# Patient Record
Sex: MALE | Race: ASIAN | NOT HISPANIC OR LATINO
[De-identification: names, ages, dates, MRNs, and addresses within clinical notes are randomized per-mention and may not be internally consistent; named-entity substitution may affect disease eponyms.]

---

## 2017-12-03 ENCOUNTER — TRANSCRIPTION ENCOUNTER (OUTPATIENT)
Age: 22
End: 2017-12-03

## 2018-04-22 ENCOUNTER — HOSPITAL ENCOUNTER (EMERGENCY)
Dept: HOSPITAL 74 - FER | Age: 23
Discharge: HOME | End: 2018-04-22
Payer: SELF-PAY

## 2018-04-22 VITALS — SYSTOLIC BLOOD PRESSURE: 140 MMHG | HEART RATE: 90 BPM | TEMPERATURE: 99.1 F | DIASTOLIC BLOOD PRESSURE: 82 MMHG

## 2018-04-22 VITALS — BODY MASS INDEX: 26.6 KG/M2
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
## 2018-04-22 DIAGNOSIS — S63.681A: Primary | ICD-10-CM
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
## 2018-09-02 ENCOUNTER — HOSPITAL ENCOUNTER (EMERGENCY)
Dept: HOSPITAL 74 - FER | Age: 23
Discharge: HOME | End: 2018-09-02
Payer: COMMERCIAL

## 2018-09-02 VITALS — TEMPERATURE: 98.8 F | HEART RATE: 79 BPM | SYSTOLIC BLOOD PRESSURE: 119 MMHG | DIASTOLIC BLOOD PRESSURE: 68 MMHG

## 2018-09-02 VITALS — BODY MASS INDEX: 22 KG/M2

## 2018-09-02 DIAGNOSIS — S90.851A: Primary | ICD-10-CM
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
## 2018-09-02 PROCEDURE — 0JCQ0ZZ EXTIRPATION OF MATTER FROM RIGHT FOOT SUBCUTANEOUS TISSUE AND FASCIA, OPEN APPROACH: ICD-10-PCS

## 2019-03-17 ENCOUNTER — TRANSCRIPTION ENCOUNTER (OUTPATIENT)
Age: 24
End: 2019-03-17

## 2019-05-06 ENCOUNTER — HOSPITAL ENCOUNTER (EMERGENCY)
Dept: HOSPITAL 74 - FER | Age: 24
Discharge: HOME | End: 2019-05-06
Payer: COMMERCIAL

## 2019-05-06 VITALS — BODY MASS INDEX: 24.3 KG/M2

## 2019-05-06 VITALS — DIASTOLIC BLOOD PRESSURE: 100 MMHG | SYSTOLIC BLOOD PRESSURE: 148 MMHG | HEART RATE: 86 BPM | TEMPERATURE: 98.3 F

## 2019-05-06 DIAGNOSIS — S83.412A: Primary | ICD-10-CM
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
## 2021-03-05 PROBLEM — Z00.00 ENCOUNTER FOR PREVENTIVE HEALTH EXAMINATION: Status: ACTIVE | Noted: 2021-03-05

## 2021-03-15 ENCOUNTER — NON-APPOINTMENT (OUTPATIENT)
Age: 26
End: 2021-03-15

## 2021-03-15 ENCOUNTER — APPOINTMENT (OUTPATIENT)
Dept: INTERNAL MEDICINE | Facility: CLINIC | Age: 26
End: 2021-03-15
Payer: COMMERCIAL

## 2021-03-15 ENCOUNTER — LABORATORY RESULT (OUTPATIENT)
Age: 26
End: 2021-03-15

## 2021-03-15 VITALS
OXYGEN SATURATION: 99 % | SYSTOLIC BLOOD PRESSURE: 138 MMHG | WEIGHT: 158 LBS | BODY MASS INDEX: 25.39 KG/M2 | DIASTOLIC BLOOD PRESSURE: 92 MMHG | TEMPERATURE: 97.2 F | HEIGHT: 66 IN | HEART RATE: 90 BPM

## 2021-03-15 DIAGNOSIS — Z11.4 ENCOUNTER FOR SCREENING FOR HUMAN IMMUNODEFICIENCY VIRUS [HIV]: ICD-10-CM

## 2021-03-15 DIAGNOSIS — Z11.59 ENCOUNTER FOR SCREENING FOR OTHER VIRAL DISEASES: ICD-10-CM

## 2021-03-15 DIAGNOSIS — Z13.21 ENCOUNTER FOR SCREENING FOR NUTRITIONAL DISORDER: ICD-10-CM

## 2021-03-15 DIAGNOSIS — Z13.1 ENCOUNTER FOR SCREENING FOR DIABETES MELLITUS: ICD-10-CM

## 2021-03-15 DIAGNOSIS — Z13.220 ENCOUNTER FOR SCREENING FOR LIPOID DISORDERS: ICD-10-CM

## 2021-03-15 PROCEDURE — 36415 COLL VENOUS BLD VENIPUNCTURE: CPT

## 2021-03-15 PROCEDURE — 99385 PREV VISIT NEW AGE 18-39: CPT | Mod: 25

## 2021-03-15 PROCEDURE — 99214 OFFICE O/P EST MOD 30 MIN: CPT | Mod: 25

## 2021-03-15 PROCEDURE — 93000 ELECTROCARDIOGRAM COMPLETE: CPT | Mod: 59

## 2021-03-15 PROCEDURE — 99072 ADDL SUPL MATRL&STAF TM PHE: CPT

## 2021-03-15 PROCEDURE — G0444 DEPRESSION SCREEN ANNUAL: CPT

## 2021-03-15 PROCEDURE — G0442 ANNUAL ALCOHOL SCREEN 15 MIN: CPT

## 2021-03-15 NOTE — PHYSICAL EXAM
[No Acute Distress] : no acute distress [Well Nourished] : well nourished [Well Developed] : well developed [Well-Appearing] : well-appearing [Normal Sclera/Conjunctiva] : normal sclera/conjunctiva [EOMI] : extraocular movements intact [Normal Outer Ear/Nose] : the outer ears and nose were normal in appearance [No JVD] : no jugular venous distention [No Lymphadenopathy] : no lymphadenopathy [Supple] : supple [Thyroid Normal, No Nodules] : the thyroid was normal and there were no nodules present [No Respiratory Distress] : no respiratory distress  [No Accessory Muscle Use] : no accessory muscle use [Clear to Auscultation] : lungs were clear to auscultation bilaterally [Normal Percussion] : the chest was normal to percussion [Normal Rate] : normal rate  [Regular Rhythm] : with a regular rhythm [Normal S1, S2] : normal S1 and S2 [No Varicosities] : no varicosities [Pedal Pulses Present] : the pedal pulses are present [No Edema] : there was no peripheral edema [No Palpable Aorta] : no palpable aorta [No Extremity Clubbing/Cyanosis] : no extremity clubbing/cyanosis [Soft] : abdomen soft [Non Tender] : non-tender [Non-distended] : non-distended [No Masses] : no abdominal mass palpated [No HSM] : no HSM [Normal Bowel Sounds] : normal bowel sounds [No Hernias] : no hernias [Normal Supraclavicular Nodes] : no supraclavicular lymphadenopathy [Normal Posterior Cervical Nodes] : no posterior cervical lymphadenopathy [Normal Anterior Cervical Nodes] : no anterior cervical lymphadenopathy [Normal Inguinal Nodes] : no inguinal lymphadenopathy [Normal Femoral Nodes] : no femoral lymphadenopathy [No CVA Tenderness] : no CVA  tenderness [No Spinal Tenderness] : no spinal tenderness [No Joint Swelling] : no joint swelling [Grossly Normal Strength/Tone] : grossly normal strength/tone [No Rash] : no rash [Coordination Grossly Intact] : coordination grossly intact [No Focal Deficits] : no focal deficits [Normal Gait] : normal gait [Deep Tendon Reflexes (DTR)] : deep tendon reflexes were 2+ and symmetric [Speech Grossly Normal] : speech grossly normal [Memory Grossly Normal] : memory grossly normal [Normal Affect] : the affect was normal [Alert and Oriented x3] : oriented to person, place, and time [Normal Mood] : the mood was normal [Normal Insight/Judgement] : insight and judgment were intact [de-identified] : 1-2/6 systolic ejection murmur at base; 1-2/6 systolic murmur at apex. [de-identified] : Normal testes and genitalia.  No hernias.

## 2021-03-15 NOTE — HISTORY OF PRESENT ILLNESS
[FreeTextEntry1] : 25-year-old male here to establish care, for annual comprehensive evaluation and with several medical issues. [de-identified] : The patient feels he is in good physical health, but believes he may have ADHD because he has had trouble paying attention to things.  He also reports, however, that he is suffering from anxiety and depression.  Both of these conditions seem to have begun after the onset of the pandemic, with the patient had to move back in with his parents.  He feels his mother is very demanding and controlling and his father is not very understanding of the situation.  He is able to function at work and elsewhere, but feels that these conditions make it more difficult to do so.  He has had no suicidal ideation. \par The patient has chest pain infrequently, which is always associated with periods of anxiety.  He has never had any exertional chest pain or any other cardiovascular symptoms.

## 2021-03-15 NOTE — HEALTH RISK ASSESSMENT
[0-5] : 0-5 [No] : In the past 12 months have you used drugs other than those required for medical reasons? No [No falls in past year] : Patient reported no falls in the past year [3] : 1) Little interest or pleasure doing things for nearly every day (3) [2] : 2) Feeling down, depressed, or hopeless for more than half of the days (2) [HIV Test offered] : HIV Test offered [Hepatitis C test offered] : Hepatitis C test offered [Fully functional (bathing, dressing, toileting, transferring, walking, feeding)] : Fully functional (bathing, dressing, toileting, transferring, walking, feeding) [Fully functional (using the telephone, shopping, preparing meals, housekeeping, doing laundry, using] : Fully functional and needs no help or supervision to perform IADLs (using the telephone, shopping, preparing meals, housekeeping, doing laundry, using transportation, managing medications and managing finances) [] : No [Audit-CScore] : 0 [[id number]] : 5 [QPD2Lyrzx] : 20

## 2021-03-15 NOTE — ASSESSMENT
[FreeTextEntry1] : Plan as above.\par \par The patient was advised to call for any problems or questions.\par \par Return visit for follow-up in 1 month.

## 2021-03-15 NOTE — REVIEW OF SYSTEMS
[Fatigue] : fatigue [Chest Pain] : chest pain [Insomnia] : insomnia [Anxiety] : anxiety [Depression] : depression [Negative] : Heme/Lymph [Suicidal] : not suicidal

## 2021-03-16 ENCOUNTER — TRANSCRIPTION ENCOUNTER (OUTPATIENT)
Age: 26
End: 2021-03-16

## 2021-03-16 DIAGNOSIS — R74.8 ABNORMAL LEVELS OF OTHER SERUM ENZYMES: ICD-10-CM

## 2021-03-16 LAB
25(OH)D3 SERPL-MCNC: 13.5 NG/ML
ALBUMIN SERPL ELPH-MCNC: 4.6 G/DL
ALP BLD-CCNC: 106 U/L
ALT SERPL-CCNC: 80 U/L
ANION GAP SERPL CALC-SCNC: 11 MMOL/L
AST SERPL-CCNC: 36 U/L
BASOPHILS # BLD AUTO: 0.02 K/UL
BASOPHILS NFR BLD AUTO: 0.2 %
BILIRUB SERPL-MCNC: 0.3 MG/DL
BUN SERPL-MCNC: 18 MG/DL
CALCIUM SERPL-MCNC: 9.5 MG/DL
CHLORIDE SERPL-SCNC: 104 MMOL/L
CHOLEST SERPL-MCNC: 203 MG/DL
CO2 SERPL-SCNC: 26 MMOL/L
CREAT SERPL-MCNC: 0.88 MG/DL
EOSINOPHIL # BLD AUTO: 0.13 K/UL
EOSINOPHIL NFR BLD AUTO: 1.5 %
ESTIMATED AVERAGE GLUCOSE: 114 MG/DL
GLUCOSE SERPL-MCNC: 97 MG/DL
HBA1C MFR BLD HPLC: 5.6 %
HCT VFR BLD CALC: 47.3 %
HCV AB SER QL: NONREACTIVE
HCV S/CO RATIO: 0.13 S/CO
HDLC SERPL-MCNC: 26 MG/DL
HGB BLD-MCNC: 15 G/DL
HIV1+2 AB SPEC QL IA.RAPID: NONREACTIVE
IMM GRANULOCYTES NFR BLD AUTO: 0.4 %
LDLC SERPL CALC-MCNC: NORMAL MG/DL
LYMPHOCYTES # BLD AUTO: 3.54 K/UL
LYMPHOCYTES NFR BLD AUTO: 39.8 %
MAN DIFF?: NORMAL
MCHC RBC-ENTMCNC: 28.8 PG
MCHC RBC-ENTMCNC: 31.7 GM/DL
MCV RBC AUTO: 91 FL
MONOCYTES # BLD AUTO: 0.63 K/UL
MONOCYTES NFR BLD AUTO: 7.1 %
NEUTROPHILS # BLD AUTO: 4.54 K/UL
NEUTROPHILS NFR BLD AUTO: 51 %
NONHDLC SERPL-MCNC: 177 MG/DL
PLATELET # BLD AUTO: 310 K/UL
POTASSIUM SERPL-SCNC: 3.9 MMOL/L
PROT SERPL-MCNC: 7.9 G/DL
RBC # BLD: 5.2 M/UL
RBC # FLD: 13 %
SODIUM SERPL-SCNC: 142 MMOL/L
TRIGL SERPL-MCNC: 401 MG/DL
TSH SERPL-ACNC: 3.93 UIU/ML
WBC # FLD AUTO: 8.9 K/UL

## 2021-04-15 ENCOUNTER — APPOINTMENT (OUTPATIENT)
Dept: INTERNAL MEDICINE | Facility: CLINIC | Age: 26
End: 2021-04-15
Payer: COMMERCIAL

## 2021-04-15 VITALS
HEART RATE: 87 BPM | DIASTOLIC BLOOD PRESSURE: 70 MMHG | OXYGEN SATURATION: 97 % | WEIGHT: 160 LBS | TEMPERATURE: 97.2 F | SYSTOLIC BLOOD PRESSURE: 132 MMHG

## 2021-04-15 DIAGNOSIS — Z87.898 PERSONAL HISTORY OF OTHER SPECIFIED CONDITIONS: ICD-10-CM

## 2021-04-15 PROCEDURE — G0444 DEPRESSION SCREEN ANNUAL: CPT

## 2021-04-15 PROCEDURE — 99072 ADDL SUPL MATRL&STAF TM PHE: CPT

## 2021-04-15 PROCEDURE — 99214 OFFICE O/P EST MOD 30 MIN: CPT | Mod: 25

## 2021-04-15 NOTE — PHYSICAL EXAM
[Normal Sclera/Conjunctiva] : normal sclera/conjunctiva [Normal Outer Ear/Nose] : the outer ears and nose were normal in appearance [Normal Percussion] : the chest was normal to percussion [Normal Rate] : normal rate  [Regular Rhythm] : with a regular rhythm [Normal S1, S2] : normal S1 and S2 [No Edema] : there was no peripheral edema [No Rash] : no rash [Normal Gait] : normal gait [Speech Grossly Normal] : speech grossly normal [Memory Grossly Normal] : memory grossly normal [Alert and Oriented x3] : oriented to person, place, and time [Normal Mood] : the mood was normal [Normal] : affect was normal and insight and judgment were intact [de-identified] : 1-2/6 soft systolic murmur at base and apex.

## 2021-04-15 NOTE — HISTORY OF PRESENT ILLNESS
[FreeTextEntry1] : 25-year-old male here for follow-up of high blood pressure and depression/anxiety. [de-identified] : The patient has been doing very well since his previous visit.  He is taking the antidepressant without any side effects, and reports that his depression is much better.  His anxiety is still present, although may be slightly improved as well.  He has had no further chest pain or any other cardiovascular symptoms, even with exertion or exercise.  He is exercising 6 days/week without any symptoms or limitations.\par The patient had his Covid vaccine, but developed severe pain in his left arm at the site of injection.  He took some Aleve for approximately 1 week and reports that his symptoms have fully resolved.  He also developed some right-sided low back pain several days after having the vaccine, but this too has resolved.

## 2021-04-15 NOTE — ASSESSMENT
[FreeTextEntry1] : Plan as above.\par \par The patient was advised to call for any problems or questions.\par \par Return visit for follow-up in 3 months.

## 2021-04-21 ENCOUNTER — RESULT REVIEW (OUTPATIENT)
Age: 26
End: 2021-04-21

## 2021-04-21 DIAGNOSIS — R01.1 CARDIAC MURMUR, UNSPECIFIED: ICD-10-CM

## 2021-07-15 ENCOUNTER — APPOINTMENT (OUTPATIENT)
Dept: INTERNAL MEDICINE | Facility: CLINIC | Age: 26
End: 2021-07-15
Payer: COMMERCIAL

## 2021-07-15 VITALS
TEMPERATURE: 97.7 F | HEIGHT: 68 IN | RESPIRATION RATE: 16 BRPM | DIASTOLIC BLOOD PRESSURE: 76 MMHG | BODY MASS INDEX: 24.55 KG/M2 | OXYGEN SATURATION: 97 % | SYSTOLIC BLOOD PRESSURE: 140 MMHG | HEART RATE: 97 BPM | WEIGHT: 162 LBS

## 2021-07-15 DIAGNOSIS — Z87.898 PERSONAL HISTORY OF OTHER SPECIFIED CONDITIONS: ICD-10-CM

## 2021-07-15 PROCEDURE — 99214 OFFICE O/P EST MOD 30 MIN: CPT | Mod: 25

## 2021-07-15 PROCEDURE — 99072 ADDL SUPL MATRL&STAF TM PHE: CPT

## 2021-07-15 PROCEDURE — G0444 DEPRESSION SCREEN ANNUAL: CPT | Mod: 59

## 2021-07-15 PROCEDURE — 36415 COLL VENOUS BLD VENIPUNCTURE: CPT

## 2021-07-15 NOTE — HISTORY OF PRESENT ILLNESS
[FreeTextEntry1] : 26-year-old male here for follow-up of depression and other medical issues.    [de-identified] : Patient reports that his depression has essentially resolved with use of the paroxetine.  He no longer feels depressed at all and is functioning quite normally.  He reports, however, that he still has anxiety, although what he describes as anxiety is really an inability to focus.  He gives examples of tasks he is asked to do, after which he gets distracted and does not complete the required task.  He sometimes becomes anxious because of his poor performance, with the primary problem seems to be his lack of focus.\par The patient also complains of chronic pain in his left knee that he has had since an injury in high school.  He has no swelling or instability in the knee, but does have pain, especially in colder weather.  He did physical therapy in the past, which was somewhat helpful, but he no longer does any exercises to strengthen his knees.\par The patient understands his diagnosis of asymmetric septal hypertrophy.  He has had no cardiovascular symptoms, even with exertion or exercise.  We will simply monitor this condition and repeat the echocardiogram in 1 year.

## 2021-07-15 NOTE — REVIEW OF SYSTEMS
[Anxiety] : anxiety [Negative] : Heme/Lymph [Suicidal] : not suicidal [Insomnia] : no insomnia [Depression] : no depression [FreeTextEntry9] : See HPI. [de-identified] : See HPI.

## 2021-07-15 NOTE — PHYSICAL EXAM
[Normal Sclera/Conjunctiva] : normal sclera/conjunctiva [Normal Outer Ear/Nose] : the outer ears and nose were normal in appearance [Normal Percussion] : the chest was normal to percussion [Normal Rate] : normal rate  [Regular Rhythm] : with a regular rhythm [Normal S1, S2] : normal S1 and S2 [No Edema] : there was no peripheral edema [Normal Gait] : normal gait [Speech Grossly Normal] : speech grossly normal [Memory Grossly Normal] : memory grossly normal [Alert and Oriented x3] : oriented to person, place, and time [Normal Mood] : the mood was normal [Normal] : affect was normal and insight and judgment were intact [de-identified] : 1-2/6 systolic murmur LLSB.

## 2021-07-15 NOTE — HEALTH RISK ASSESSMENT
[0] : 2) Feeling down, depressed, or hopeless: Not at all (0) [PHQ-9 Positive] : PHQ-9 Positive [I have developed a follow-up plan documented below in the note.] : I have developed a follow-up plan documented below in the note. [[id number]] : 0

## 2021-07-16 LAB
25(OH)D3 SERPL-MCNC: 17.6 NG/ML
ALBUMIN SERPL ELPH-MCNC: 4.9 G/DL
ALP BLD-CCNC: 104 U/L
ALT SERPL-CCNC: 47 U/L
ANION GAP SERPL CALC-SCNC: 15 MMOL/L
AST SERPL-CCNC: 33 U/L
BILIRUB SERPL-MCNC: 1.1 MG/DL
BUN SERPL-MCNC: 16 MG/DL
CALCIUM SERPL-MCNC: 9.8 MG/DL
CHLORIDE SERPL-SCNC: 106 MMOL/L
CO2 SERPL-SCNC: 22 MMOL/L
CREAT SERPL-MCNC: 0.89 MG/DL
GLUCOSE SERPL-MCNC: 98 MG/DL
POTASSIUM SERPL-SCNC: 4.1 MMOL/L
PROT SERPL-MCNC: 8.2 G/DL
SODIUM SERPL-SCNC: 143 MMOL/L

## 2021-09-06 ENCOUNTER — TRANSCRIPTION ENCOUNTER (OUTPATIENT)
Age: 26
End: 2021-09-06

## 2021-10-15 ENCOUNTER — APPOINTMENT (OUTPATIENT)
Dept: INTERNAL MEDICINE | Facility: CLINIC | Age: 26
End: 2021-10-15
Payer: COMMERCIAL

## 2021-10-15 VITALS
TEMPERATURE: 97.3 F | BODY MASS INDEX: 25.74 KG/M2 | HEART RATE: 78 BPM | WEIGHT: 164 LBS | RESPIRATION RATE: 16 BRPM | DIASTOLIC BLOOD PRESSURE: 82 MMHG | HEIGHT: 67 IN | SYSTOLIC BLOOD PRESSURE: 128 MMHG | OXYGEN SATURATION: 100 %

## 2021-10-15 DIAGNOSIS — R03.0 ELEVATED BLOOD-PRESSURE READING, W/OUT DIAGNOSIS OF HYPERTENSION: ICD-10-CM

## 2021-10-15 DIAGNOSIS — E78.1 PURE HYPERGLYCERIDEMIA: ICD-10-CM

## 2021-10-15 PROCEDURE — 36415 COLL VENOUS BLD VENIPUNCTURE: CPT

## 2021-10-15 PROCEDURE — 99214 OFFICE O/P EST MOD 30 MIN: CPT | Mod: 25

## 2021-10-15 PROCEDURE — G0008: CPT

## 2021-10-15 PROCEDURE — 90686 IIV4 VACC NO PRSV 0.5 ML IM: CPT

## 2021-10-15 PROCEDURE — 99072 ADDL SUPL MATRL&STAF TM PHE: CPT

## 2021-10-15 NOTE — HISTORY OF PRESENT ILLNESS
[FreeTextEntry1] : 26-year-old male here for follow-up of several medical issues. [de-identified] : The patient continues to take his paroxetine and reports that his depression and anxiety are both very well controlled.  He really has no more episodes of depression, but occasionally his mind starts focusing on issues, which starts to make him somewhat anxious.  He is able to break this cycle by going for a walk or just directing his mind elsewhere, and he reports that he is generally functioning quite well.\par The patient now reports a history of several incidents of head trauma while a teenager, which he believes is causing him to have problems with his memory.  He has trouble concentrating on things and occasionally forgets what he was just talking about.  He suspects that this may be related to his previous episodes of head trauma.\par The patient recently had an infected cyst on his lower back for which she went to urgent care.  He has been using warm soaks to the area and took 1 week's worth of antibiotics.  He reports that the infected cyst is feeling much better and continues to improve.

## 2021-10-15 NOTE — PHYSICAL EXAM
[Normal Sclera/Conjunctiva] : normal sclera/conjunctiva [Normal Outer Ear/Nose] : the outer ears and nose were normal in appearance [Normal Percussion] : the chest was normal to percussion [No Edema] : there was no peripheral edema [Coordination Grossly Intact] : coordination grossly intact [No Focal Deficits] : no focal deficits [Normal Gait] : normal gait [Speech Grossly Normal] : speech grossly normal [Alert and Oriented x3] : oriented to person, place, and time [Normal Mood] : the mood was normal [Normal] : affect was normal and insight and judgment were intact [de-identified] : 1x1.5 cm area of induration lower back right side of spine, nontender, nonwarm.

## 2021-10-17 LAB
ALBUMIN SERPL ELPH-MCNC: 4.9 G/DL
ALP BLD-CCNC: 98 U/L
ALT SERPL-CCNC: 37 U/L
ANION GAP SERPL CALC-SCNC: 13 MMOL/L
AST SERPL-CCNC: 36 U/L
BILIRUB SERPL-MCNC: 0.8 MG/DL
BUN SERPL-MCNC: 18 MG/DL
CALCIUM SERPL-MCNC: 9.7 MG/DL
CHLORIDE SERPL-SCNC: 106 MMOL/L
CHOLEST SERPL-MCNC: 194 MG/DL
CO2 SERPL-SCNC: 25 MMOL/L
CREAT SERPL-MCNC: 0.79 MG/DL
GLUCOSE SERPL-MCNC: 99 MG/DL
HDLC SERPL-MCNC: 35 MG/DL
LDLC SERPL CALC-MCNC: 132 MG/DL
NONHDLC SERPL-MCNC: 159 MG/DL
POTASSIUM SERPL-SCNC: 4.2 MMOL/L
PROT SERPL-MCNC: 8.1 G/DL
SODIUM SERPL-SCNC: 144 MMOL/L
TRIGL SERPL-MCNC: 134 MG/DL

## 2021-10-18 ENCOUNTER — APPOINTMENT (OUTPATIENT)
Dept: NEUROLOGY | Facility: CLINIC | Age: 26
End: 2021-10-18
Payer: COMMERCIAL

## 2021-10-18 ENCOUNTER — NON-APPOINTMENT (OUTPATIENT)
Age: 26
End: 2021-10-18

## 2021-10-18 VITALS
SYSTOLIC BLOOD PRESSURE: 122 MMHG | BODY MASS INDEX: 24.86 KG/M2 | TEMPERATURE: 97.2 F | HEIGHT: 68 IN | WEIGHT: 164 LBS | DIASTOLIC BLOOD PRESSURE: 76 MMHG | HEART RATE: 74 BPM

## 2021-10-18 DIAGNOSIS — Z82.3 FAMILY HISTORY OF STROKE: ICD-10-CM

## 2021-10-18 DIAGNOSIS — Z87.898 PERSONAL HISTORY OF OTHER SPECIFIED CONDITIONS: ICD-10-CM

## 2021-10-18 DIAGNOSIS — R51.9 HEADACHE, UNSPECIFIED: ICD-10-CM

## 2021-10-18 DIAGNOSIS — Z80.3 FAMILY HISTORY OF MALIGNANT NEOPLASM OF BREAST: ICD-10-CM

## 2021-10-18 DIAGNOSIS — Z87.828 PERSONAL HISTORY OF OTHER (HEALED) PHYSICAL INJURY AND TRAUMA: ICD-10-CM

## 2021-10-18 PROCEDURE — 99204 OFFICE O/P NEW MOD 45 MIN: CPT

## 2021-10-18 PROCEDURE — 99072 ADDL SUPL MATRL&STAF TM PHE: CPT

## 2021-10-18 RX ORDER — PAROXETINE HYDROCHLORIDE 10 MG/1
10 TABLET, FILM COATED ORAL DAILY
Qty: 90 | Refills: 1 | Status: DISCONTINUED | COMMUNITY
Start: 2021-03-15 | End: 2021-10-18

## 2021-10-19 PROBLEM — R51.9 HEADACHE: Status: ACTIVE | Noted: 2021-10-18

## 2021-10-19 PROBLEM — Z82.3 FAMILY HISTORY OF CEREBROVASCULAR ACCIDENT (CVA): Status: ACTIVE | Noted: 2021-10-19

## 2021-10-19 PROBLEM — Z87.828 HISTORY OF TRAUMATIC INJURY OF HEAD: Status: RESOLVED | Noted: 2021-10-18 | Resolved: 2021-10-19

## 2021-10-19 PROBLEM — Z87.898 HISTORY OF VERTIGO: Status: RESOLVED | Noted: 2021-10-18 | Resolved: 2021-10-19

## 2021-10-19 PROBLEM — Z80.3 FAMILY HISTORY OF MALIGNANT NEOPLASM OF BREAST: Status: ACTIVE | Noted: 2021-10-19

## 2021-10-19 LAB
25(OH)D3 SERPL-MCNC: 20.2 NG/ML
ALBUMIN SERPL ELPH-MCNC: 4.8 G/DL
ALP BLD-CCNC: 114 U/L
ALT SERPL-CCNC: 35 U/L
ANION GAP SERPL CALC-SCNC: 16 MMOL/L
AST SERPL-CCNC: 25 U/L
BILIRUB SERPL-MCNC: 0.5 MG/DL
BUN SERPL-MCNC: 17 MG/DL
CALCIUM SERPL-MCNC: 9.5 MG/DL
CHLORIDE SERPL-SCNC: 104 MMOL/L
CO2 SERPL-SCNC: 21 MMOL/L
CREAT SERPL-MCNC: 0.89 MG/DL
GLUCOSE SERPL-MCNC: 93 MG/DL
POTASSIUM SERPL-SCNC: 4 MMOL/L
PROT SERPL-MCNC: 8 G/DL
SODIUM SERPL-SCNC: 141 MMOL/L
VIT B12 SERPL-MCNC: 597 PG/ML

## 2021-12-11 ENCOUNTER — RESULT REVIEW (OUTPATIENT)
Age: 26
End: 2021-12-11

## 2021-12-14 ENCOUNTER — TRANSCRIPTION ENCOUNTER (OUTPATIENT)
Age: 26
End: 2021-12-14

## 2021-12-15 ENCOUNTER — APPOINTMENT (OUTPATIENT)
Dept: NEUROLOGY | Facility: CLINIC | Age: 26
End: 2021-12-15

## 2021-12-16 ENCOUNTER — APPOINTMENT (OUTPATIENT)
Dept: NEUROLOGY | Facility: CLINIC | Age: 26
End: 2021-12-16

## 2021-12-26 ENCOUNTER — RX RENEWAL (OUTPATIENT)
Age: 26
End: 2021-12-26

## 2022-01-05 ENCOUNTER — APPOINTMENT (OUTPATIENT)
Dept: NEUROLOGY | Facility: CLINIC | Age: 27
End: 2022-01-05
Payer: COMMERCIAL

## 2022-01-05 PROCEDURE — 95816 EEG AWAKE AND DROWSY: CPT

## 2022-01-06 ENCOUNTER — APPOINTMENT (OUTPATIENT)
Dept: NEUROLOGY | Facility: CLINIC | Age: 27
End: 2022-01-06

## 2022-01-06 PROCEDURE — 95708 EEG WO VID EA 12-26HR UNMNTR: CPT

## 2022-01-06 PROCEDURE — 95700 EEG CONT REC W/VID EEG TECH: CPT

## 2022-01-06 PROCEDURE — 95719 EEG PHYS/QHP EA INCR W/O VID: CPT

## 2022-01-12 NOTE — DISCUSSION/SUMMARY
[FreeTextEntry1] : Santiago is a pleasant 26-year-old right-handed man with a history of anxiety, depression, history of head injury presenting with report of memory and attention problems. No longer taking Paxil. Non-focal neurologic exam. \par \par Suspect subclinical anxiety/depression could be contributing. Will do MRI brain without contrast given history of head injury, vertigo, headaches and memory and attention deficit.

## 2022-01-12 NOTE — ASSESSMENT
[FreeTextEntry1] : lab work (at [facility])\par MRI brain without contrast  \par Routine and ambulatory EEG \par Daily multivitamin \par Recommend exercise: 3 x a week for 30 minutes \par Recommend healthy diet , limit processed foods - high in vegetables, fruits \par Ibuprofen for headache, 7-8 hours of sleep a night, adequate hydration \par Formal neuropsych testing at next visit \par \par Return to clinic in 3 months

## 2022-01-12 NOTE — HISTORY OF PRESENT ILLNESS
[FreeTextEntry1] : Santiago is a 26-year-old right-handed man with a history of anxiety, vertigo, numerous head injuries presenting for evaluation of difficulties with concentration and memory. This is a chronic problem and began around .  \par He states that in middle school a locker slammed into his head and once he was hit in the head with a baseball. He had no loss of consciousness. [place] age 16 he fell through a ceiling. He hit his head but did not lose consciousness. He had numerous cuts/abrasions but did not break/fracture anything. \par \par With regard to memory, he states he can’t recall what he did two days ago. He will often walk somewhere but doesn’t remember what he was doing/ where he was going. He lacks focus and has problems with attention too. Denies depression although there is a chart noted from Dr. Polk which indicates that depression and anxiety improved with paxil.  He is no longer taking it. \par \par He reports history of headaches. Headaches can be left sided or right sided. These are sporadic now. \par No COVID infection No family history of Alzheimer’s. \par \par Past Medical History: \par Anxiety\par Heart murmur\par Vertigo \par Medications:\par None \par Allergies: \par None \par \par Social History\par Lives in [place]\par Drives \par Associates Degree took a semester off. \par Drinks alcohol monthly, no binge drinking\par Smokes marijuana once every three months or so \par Held back in school, , had difficulty tying his shoe, fine motor deficits? \par \par Birth History: \par Born term: had pneumonia, was an emergency , few days in the NICU, not intubated. Breech birth.  no history of meningitis or encephalitis. No history of seizures. \par \par Family History: \par Mother : breast cancer \par Father:  stroke, nicotine dependence \par \par Surgeries: none \par \par  \par

## 2022-01-12 NOTE — REASON FOR VISIT
[Consultation] : a consultation visit [FreeTextEntry1] : Head trauma in childhood now experiencing memory problem sometimes headaches and had vertigo in the past

## 2022-01-12 NOTE — PHYSICAL EXAM
[FreeTextEntry1] : Mental Status: alert and oriented x 3. Able to name pen, button. Able to calculate number of quarters in $1.50. Difficulty with serial sevens. Able to spell world backwards. \par STM 3/3 immediate, 2/3 at five minutes, \par CN: visual acuity, VFF, blink to confrontation \par III, IV, VI, PERRL, EOMI \par V sensation normal to light touch, pinprick\par VII normal squint vs resistance, normal smile, face symmetric \par VIII: normal hearing \par IX, X normal gag, symmetric palate, uvula raises midline \par XI normal shrug versus resistance and lateralization of head versus resistance \par XII tongue symmetric, normal strength, no tremor or fasciculation \par Motor: full strength \par Sensory: normal to LT \par Reflexes \par Brachioradialis, biceps, triceps, patella and ankles 2+ \par Plantar flexor response bilaterally \par Coordination: no dysmetria on FNF \par Gait : normal gait \par \par \par

## 2022-01-14 ENCOUNTER — APPOINTMENT (OUTPATIENT)
Dept: INTERNAL MEDICINE | Facility: CLINIC | Age: 27
End: 2022-01-14

## 2022-01-14 ENCOUNTER — NON-APPOINTMENT (OUTPATIENT)
Age: 27
End: 2022-01-14

## 2022-02-09 ENCOUNTER — APPOINTMENT (OUTPATIENT)
Dept: INTERNAL MEDICINE | Facility: CLINIC | Age: 27
End: 2022-02-09
Payer: COMMERCIAL

## 2022-02-09 VITALS
BODY MASS INDEX: 25.76 KG/M2 | HEART RATE: 90 BPM | OXYGEN SATURATION: 100 % | RESPIRATION RATE: 16 BRPM | TEMPERATURE: 97.2 F | SYSTOLIC BLOOD PRESSURE: 132 MMHG | WEIGHT: 170 LBS | DIASTOLIC BLOOD PRESSURE: 80 MMHG | HEIGHT: 68 IN

## 2022-02-09 DIAGNOSIS — R41.3 OTHER AMNESIA: ICD-10-CM

## 2022-02-09 DIAGNOSIS — U07.1 COVID-19: ICD-10-CM

## 2022-02-09 PROCEDURE — 99214 OFFICE O/P EST MOD 30 MIN: CPT

## 2022-02-09 NOTE — PHYSICAL EXAM
[Normal Sclera/Conjunctiva] : normal sclera/conjunctiva [Normal Outer Ear/Nose] : the outer ears and nose were normal in appearance [Normal Percussion] : the chest was normal to percussion [No Edema] : there was no peripheral edema [Normal Supraclavicular Nodes] : no supraclavicular lymphadenopathy [Coordination Grossly Intact] : coordination grossly intact [No Focal Deficits] : no focal deficits [Normal Gait] : normal gait [Speech Grossly Normal] : speech grossly normal [Memory Grossly Normal] : memory grossly normal [Alert and Oriented x3] : oriented to person, place, and time [Normal Mood] : the mood was normal [Normal] : affect was normal and insight and judgment were intact 0

## 2022-02-09 NOTE — ASSESSMENT
[FreeTextEntry1] : Plan as above.\par \par The patient will get his Covid booster as soon as possible and send me the type and date of administration.\par \par The patient was advised to call for any problems or questions.\par \par Return visit in 1 month for follow-up and annual exam.

## 2022-02-09 NOTE — HISTORY OF PRESENT ILLNESS
[FreeTextEntry1] : 26-year-old male here for follow-up of anxiety/depression and memory problem. [de-identified] : The patient reports that he was sick with COVID in December, 2021.  His father had been exposed while hospitalized and subsequently tested positive.  The patient then developed symptoms and was also tested positive.  He had general achiness and fever for several days, but was totally recovered after just a few days and has been fine since that time.\par The patient stopped taking his paroxetine when he had Covid and he never restarted it.  He does not think he was any better off taking the paroxetine, would to try different medication to help control his anxiety.  His depression has not been a problem.\par The patient had a complete evaluation by a neurologist for his memory problem.  An MRI and EEG were both normal.

## 2022-03-09 ENCOUNTER — APPOINTMENT (OUTPATIENT)
Dept: INTERNAL MEDICINE | Facility: CLINIC | Age: 27
End: 2022-03-09
Payer: COMMERCIAL

## 2022-03-09 VITALS
OXYGEN SATURATION: 99 % | DIASTOLIC BLOOD PRESSURE: 78 MMHG | HEART RATE: 100 BPM | TEMPERATURE: 97 F | WEIGHT: 170 LBS | RESPIRATION RATE: 16 BRPM | SYSTOLIC BLOOD PRESSURE: 138 MMHG | BODY MASS INDEX: 25.76 KG/M2 | HEIGHT: 68 IN

## 2022-03-09 DIAGNOSIS — Z86.59 PERSONAL HISTORY OF OTHER MENTAL AND BEHAVIORAL DISORDERS: ICD-10-CM

## 2022-03-09 PROCEDURE — 99213 OFFICE O/P EST LOW 20 MIN: CPT | Mod: 25

## 2022-03-09 PROCEDURE — G0444 DEPRESSION SCREEN ANNUAL: CPT | Mod: 59

## 2022-03-09 RX ORDER — CHROMIUM 200 MCG
25 MCG TABLET ORAL DAILY
Qty: 30 | Refills: 5 | Status: DISCONTINUED | COMMUNITY
Start: 2021-12-26 | End: 2022-03-09

## 2022-03-09 NOTE — HISTORY OF PRESENT ILLNESS
[FreeTextEntry1] : 6-year-old male here for follow-up of anxiety and memory problems. [de-identified] : The patient is being followed by a neurologist for his memory problems.  He had an MRI, which was normal, and is scheduled for some additional tests in the near future.  He reports that he is still having mild difficulty with his memory, but that it seems to be slowly improving.\par With regards to his anxiety, this problem has continued, but he was unable to get the prescription from his pharmacy because they claimed that he did not have it and unfortunately no one contacted me to resend it.  His anxiety is unchanged and he reports that he has had no depression whatsoever.

## 2022-03-09 NOTE — PHYSICAL EXAM
[Normal Sclera/Conjunctiva] : normal sclera/conjunctiva [Normal Outer Ear/Nose] : the outer ears and nose were normal in appearance [No Respiratory Distress] : no respiratory distress  [No Accessory Muscle Use] : no accessory muscle use [Normal Gait] : normal gait [Speech Grossly Normal] : speech grossly normal [Memory Grossly Normal] : memory grossly normal [Alert and Oriented x3] : oriented to person, place, and time [Normal Mood] : the mood was normal [Normal] : affect was normal and insight and judgment were intact

## 2022-03-09 NOTE — HEALTH RISK ASSESSMENT
[0] : 2) Feeling down, depressed, or hopeless: Not at all (0) [PHQ-9 Negative - No further assessment needed] : PHQ-9 Negative - No further assessment needed [[id number]] : 0

## 2022-03-09 NOTE — DATA REVIEWED
[FreeTextEntry1] : The 2 points in the PHQ-9 above are related to sleep problems, which the patient reports is from his anxiety and not from depression.

## 2022-03-09 NOTE — ASSESSMENT
[FreeTextEntry1] : Plan as above.\par \par The patient was advised to call for any problems or questions.\par \par Return visit for follow-up and annual exam in 1 month.

## 2022-04-01 ENCOUNTER — APPOINTMENT (OUTPATIENT)
Dept: NEUROLOGY | Facility: CLINIC | Age: 27
End: 2022-04-01
Payer: COMMERCIAL

## 2022-04-01 VITALS
HEART RATE: 96 BPM | BODY MASS INDEX: 24.25 KG/M2 | OXYGEN SATURATION: 98 % | SYSTOLIC BLOOD PRESSURE: 137 MMHG | TEMPERATURE: 98 F | WEIGHT: 160 LBS | DIASTOLIC BLOOD PRESSURE: 74 MMHG | HEIGHT: 68 IN

## 2022-04-01 PROCEDURE — 99215 OFFICE O/P EST HI 40 MIN: CPT

## 2022-04-01 NOTE — HISTORY OF PRESENT ILLNESS
[FreeTextEntry1] : Santiago is a 26-year-old right-handed man with a history of anxiety, vertigo, numerous head injuries presenting for follow up for difficulties with concentration and memory.\par \par Had COVID-19 in 2021.\par \par Wellbutrin 150mg has helped and he has less anxiety. \par Memory difficulties he describe including forgetting what was posted on social media. Forgetting where he placed items. Forgetting tasks at work less frequently. Paying bills on time. Lives with brother. \par He feels he has some attention issues. \par \par No balance issues. \par \par The remaining neurological review of systems is negative.\par \par 10/18/21\par Santiago is a 26-year-old right-handed man with a history of anxiety, vertigo, numerous head injuries presenting for evaluation of difficulties with concentration and memory. This is a chronic problem and began around .  \par He states that in middle school a locker slammed into his head and once he was hit in the head with a baseball. He had no loss of consciousness. [place] overseas in  at age 16 he fell through a ceiling. He hit his head but did not lose consciousness. He had numerous cuts/abrasions but did not break/fracture anything. \par \par With regard to memory, he states he can’t recall what he did two days ago. He will often walk somewhere but doesn’t remember what he was doing/ where he was going. He lacks focus and has problems with attention too. Denies depression although there is a chart noted from Dr. Polk which indicates that depression and anxiety improved with paxil.  He is no longer taking it. \par \par He reports history of headaches. Headaches can be left sided or right sided. These are sporadic now. \par No COVID infection No family history of Alzheimer’s. \par \par Past Medical History: \par Anxiety\par Heart murmur\par Vertigo \par Medications:\par None \par Allergies: \par None \par \par Social History\par Lives in [place]\par Drives \par Associates Degree took a semester off. \par Drinks alcohol monthly, no binge drinking\par Smokes marijuana once every three months or so \par Held back in school, , had difficulty tying his shoe, fine motor deficits? \par \par Birth History: \par Born term: had pneumonia, was an emergency , few days in the NICU, not intubated. Breech birth.  no history of meningitis or encephalitis. No history of seizures. \par \par Family History: \par Mother : breast cancer \par Father:  stroke, nicotine dependence \par \par Surgeries: none \par \par  \par

## 2022-04-01 NOTE — ASSESSMENT
[FreeTextEntry1] : Daily multivitamin \par Recommend exercise: 3 x a week for 30 minutes \par Recommend healthy diet , limit processed foods - high in vegetables, fruits \par Ibuprofen for headache, 7-8 hours of sleep a night, adequate hydration \par Formal neuropsych testing \par \par Return to clinic in 3 months

## 2022-04-01 NOTE — DATA REVIEWED
[de-identified] : 12/11/21\par Impression:\par \par  Unremarkable MRI brain.\par \par  No evidence of infarct, encephalomalacia, edema or demyelination.\par \par  [de-identified] : 1/5/22\par Routine and ambulatory EEG\par normal

## 2022-04-01 NOTE — REVIEW OF SYSTEMS
[Fever] : no fever [Chills] : no chills [Feeling Tired] : not feeling tired [Anxiety] : no anxiety [Depression] : no depression [Numbness] : no numbness [Tingling] : no tingling [Seizures] : no convulsions [Dizziness] : no dizziness [Vertigo] : no vertigo [Difficulty Walking] : no difficulty walking [Eye Pain] : no eye pain [Red Eyes] : eyes not red [Loss Of Hearing] : no hearing loss [Chest Pain] : no chest pain [Palpitations] : no palpitations [Shortness Of Breath] : no shortness of breath [Wheezing] : no wheezing [Vomiting] : no vomiting [Constipation] : no constipation [Diarrhea] : no diarrhea [Joint Pain] : no joint pain [Joint Swelling] : no joint swelling [Joint Stiffness] : no joint stiffness [Itching] : no itching [Easy Bleeding] : no tendency for easy bleeding [Easy Bruising] : no tendency for easy bruising [Swollen Glands] : no swollen glands [FreeTextEntry9] : left knee

## 2022-04-01 NOTE — DISCUSSION/SUMMARY
[FreeTextEntry1] : Santiago is a pleasant 26-year-old right-handed man with a history of anxiety, depression, history of head injury presenting with report of memory and attention problems. No longer taking Paxil. Non-focal neurologic exam. \par \par Suspect subclinical anxiety/depression could be contributing. Possible ADHD.\par Will obtain formal neuropsychological testing.

## 2022-04-01 NOTE — PHYSICAL EXAM
[FreeTextEntry1] : Mental Status: alert and oriented x 3.     Difficulty with serial sevens. Able to spell world backwards. \par STM 3/3 immediate, 3/3 at five minutes, \par CN: visual acuity, VFF, blink to confrontation \par III, IV, VI, PERRL, EOMI \par V sensation normal to light touch, pinprick\par VII normal squint vs resistance, normal smile, face symmetric \par VIII: normal hearing \par IX, X normal gag, symmetric palate, uvula raises midline \par XI normal shrug versus resistance and lateralization of head versus resistance \par XII tongue symmetric, normal strength, no tremor or fasciculation \par Motor: full strength \par Sensory: normal to LT \par Reflexes \par Brachioradialis, biceps, triceps, patella and ankles 2+ \par Plantar flexor response bilaterally \par Coordination: no dysmetria on FNF \par Gait : normal gait \par \par \par

## 2022-05-16 ENCOUNTER — NON-APPOINTMENT (OUTPATIENT)
Age: 27
End: 2022-05-16

## 2022-05-16 ENCOUNTER — APPOINTMENT (OUTPATIENT)
Dept: INTERNAL MEDICINE | Facility: CLINIC | Age: 27
End: 2022-05-16

## 2022-05-26 ENCOUNTER — APPOINTMENT (OUTPATIENT)
Dept: INTERNAL MEDICINE | Facility: CLINIC | Age: 27
End: 2022-05-26
Payer: COMMERCIAL

## 2022-05-26 ENCOUNTER — NON-APPOINTMENT (OUTPATIENT)
Age: 27
End: 2022-05-26

## 2022-05-26 VITALS
WEIGHT: 171 LBS | RESPIRATION RATE: 16 BRPM | BODY MASS INDEX: 25.91 KG/M2 | DIASTOLIC BLOOD PRESSURE: 78 MMHG | HEART RATE: 85 BPM | OXYGEN SATURATION: 99 % | SYSTOLIC BLOOD PRESSURE: 122 MMHG | HEIGHT: 68 IN | TEMPERATURE: 97.2 F

## 2022-05-26 DIAGNOSIS — E55.9 VITAMIN D DEFICIENCY, UNSPECIFIED: ICD-10-CM

## 2022-05-26 DIAGNOSIS — L08.9 FOLLICULAR CYST OF THE SKIN AND SUBCUTANEOUS TISSUE, UNSPECIFIED: ICD-10-CM

## 2022-05-26 DIAGNOSIS — E78.5 HYPERLIPIDEMIA, UNSPECIFIED: ICD-10-CM

## 2022-05-26 DIAGNOSIS — R41.3 OTHER AMNESIA: ICD-10-CM

## 2022-05-26 DIAGNOSIS — E66.3 OVERWEIGHT: ICD-10-CM

## 2022-05-26 DIAGNOSIS — Z87.828 PERSONAL HISTORY OF OTHER (HEALED) PHYSICAL INJURY AND TRAUMA: ICD-10-CM

## 2022-05-26 DIAGNOSIS — L72.9 FOLLICULAR CYST OF THE SKIN AND SUBCUTANEOUS TISSUE, UNSPECIFIED: ICD-10-CM

## 2022-05-26 DIAGNOSIS — R74.01 ELEVATION OF LEVELS OF LIVER TRANSAMINASE LEVELS: ICD-10-CM

## 2022-05-26 PROCEDURE — 93000 ELECTROCARDIOGRAM COMPLETE: CPT | Mod: 59

## 2022-05-26 PROCEDURE — G0444 DEPRESSION SCREEN ANNUAL: CPT

## 2022-05-26 PROCEDURE — G0442 ANNUAL ALCOHOL SCREEN 15 MIN: CPT

## 2022-05-26 PROCEDURE — 99395 PREV VISIT EST AGE 18-39: CPT | Mod: 25

## 2022-05-26 PROCEDURE — 99214 OFFICE O/P EST MOD 30 MIN: CPT | Mod: 25

## 2022-05-26 PROCEDURE — 36415 COLL VENOUS BLD VENIPUNCTURE: CPT

## 2022-05-26 NOTE — ASSESSMENT
[FreeTextEntry1] : Plan as above.\par \par The patient will get his flu vaccine in September and send us the date of administration.\par \par The patient was advised call for any problems or questions.\par \par Return visit for annual exam in 1 year, sooner if needed.

## 2022-05-26 NOTE — HISTORY OF PRESENT ILLNESS
[FreeTextEntry1] : 27-year-old male here for his annual comprehensive evaluation and follow-up of several medical issues. [de-identified] : The patient's major complaint is that he has had pain in his left knee.  He was seen by an orthopedic surgeon and a recent MRI revealed a torn meniscus.  The patient has not been doing physical therapy recently.  He does not have pain every day, but tends to have pain when he does things that strained his knee.  He is requesting medical marijuana to treat this pain, but he has not tried any other analgesics.  \par The patient has been taking his bupropion for anxiety and reports that it has been exceptionally effective in controlling the symptoms.  He reports that his sister tells him he is much calmer and easier to deal with, and he also recently passed on examination that he was unable to pass in the past because of his anxiety.  He denies having any depression whatsoever.\par The patient has had no cardiovascular symptoms, even with exertion or exercise.

## 2022-05-26 NOTE — PHYSICAL EXAM
[No Acute Distress] : no acute distress [Well Nourished] : well nourished [Well Developed] : well developed [Well-Appearing] : well-appearing [Normal Sclera/Conjunctiva] : normal sclera/conjunctiva [EOMI] : extraocular movements intact [Normal Outer Ear/Nose] : the outer ears and nose were normal in appearance [No JVD] : no jugular venous distention [No Lymphadenopathy] : no lymphadenopathy [Supple] : supple [Thyroid Normal, No Nodules] : the thyroid was normal and there were no nodules present [No Respiratory Distress] : no respiratory distress  [No Accessory Muscle Use] : no accessory muscle use [Clear to Auscultation] : lungs were clear to auscultation bilaterally [Normal Percussion] : the chest was normal to percussion [Normal Rate] : normal rate  [Regular Rhythm] : with a regular rhythm [Normal S1, S2] : normal S1 and S2 [No Varicosities] : no varicosities [Pedal Pulses Present] : the pedal pulses are present [No Edema] : there was no peripheral edema [No Palpable Aorta] : no palpable aorta [No Extremity Clubbing/Cyanosis] : no extremity clubbing/cyanosis [Soft] : abdomen soft [Non Tender] : non-tender [Non-distended] : non-distended [No Masses] : no abdominal mass palpated [No HSM] : no HSM [Normal Bowel Sounds] : normal bowel sounds [No Hernias] : no hernias [Normal Supraclavicular Nodes] : no supraclavicular lymphadenopathy [Normal Posterior Cervical Nodes] : no posterior cervical lymphadenopathy [Normal Anterior Cervical Nodes] : no anterior cervical lymphadenopathy [Normal Inguinal Nodes] : no inguinal lymphadenopathy [Normal Femoral Nodes] : no femoral lymphadenopathy [No CVA Tenderness] : no CVA  tenderness [No Spinal Tenderness] : no spinal tenderness [No Joint Swelling] : no joint swelling [Grossly Normal Strength/Tone] : grossly normal strength/tone [No Rash] : no rash [Coordination Grossly Intact] : coordination grossly intact [No Focal Deficits] : no focal deficits [Normal Gait] : normal gait [Speech Grossly Normal] : speech grossly normal [Memory Grossly Normal] : memory grossly normal [Normal Affect] : the affect was normal [Alert and Oriented x3] : oriented to person, place, and time [Normal Mood] : the mood was normal [Normal Insight/Judgement] : insight and judgment were intact [de-identified] : 1-2/6 systolic ejection murmur at left upper sternal border without gallop or click. [de-identified] : Normal testes and genitalia.  No hernias.

## 2022-05-26 NOTE — HEALTH RISK ASSESSMENT
[Never] : Never [0-4] : 0-4 [Yes] : Yes [2 - 4 times a month (2 pts)] : 2-4 times a month (2 points) [1 or 2 (0 pts)] : 1 or 2 (0 points) [Never (0 pts)] : Never (0 points) [No] : In the past 12 months have you used drugs other than those required for medical reasons? No [One fall no injury in past year] : Patient reported one fall in the past year without injury [0] : 2) Feeling down, depressed, or hopeless: Not at all (0) [PHQ-9 Negative - No further assessment needed] : PHQ-9 Negative - No further assessment needed [Fully functional (bathing, dressing, toileting, transferring, walking, feeding)] : Fully functional (bathing, dressing, toileting, transferring, walking, feeding) [Fully functional (using the telephone, shopping, preparing meals, housekeeping, doing laundry, using] : Fully functional and needs no help or supervision to perform IADLs (using the telephone, shopping, preparing meals, housekeeping, doing laundry, using transportation, managing medications and managing finances) [Audit-CScore] : 2 [de-identified] : Intoxicated for birthday. [[id number]] : 0

## 2022-05-27 LAB
25(OH)D3 SERPL-MCNC: 16.4 NG/ML
ALBUMIN SERPL ELPH-MCNC: 4.6 G/DL
ALP BLD-CCNC: 100 U/L
ALT SERPL-CCNC: 63 U/L
ANION GAP SERPL CALC-SCNC: 10 MMOL/L
AST SERPL-CCNC: 38 U/L
BASOPHILS # BLD AUTO: 0.02 K/UL
BASOPHILS NFR BLD AUTO: 0.3 %
BILIRUB SERPL-MCNC: 0.6 MG/DL
BUN SERPL-MCNC: 12 MG/DL
CALCIUM SERPL-MCNC: 9.4 MG/DL
CHLORIDE SERPL-SCNC: 102 MMOL/L
CHOLEST SERPL-MCNC: 184 MG/DL
CO2 SERPL-SCNC: 26 MMOL/L
CREAT SERPL-MCNC: 0.88 MG/DL
EGFR: 122 ML/MIN/1.73M2
EOSINOPHIL # BLD AUTO: 0.21 K/UL
EOSINOPHIL NFR BLD AUTO: 2.8 %
ESTIMATED AVERAGE GLUCOSE: 114 MG/DL
GLUCOSE SERPL-MCNC: 101 MG/DL
HBA1C MFR BLD HPLC: 5.6 %
HCT VFR BLD CALC: 46.6 %
HDLC SERPL-MCNC: 30 MG/DL
HGB BLD-MCNC: 14.7 G/DL
IMM GRANULOCYTES NFR BLD AUTO: 0.4 %
LDLC SERPL CALC-MCNC: 108 MG/DL
LYMPHOCYTES # BLD AUTO: 2.77 K/UL
LYMPHOCYTES NFR BLD AUTO: 36.4 %
MAN DIFF?: NORMAL
MCHC RBC-ENTMCNC: 28.7 PG
MCHC RBC-ENTMCNC: 31.5 GM/DL
MCV RBC AUTO: 91 FL
MONOCYTES # BLD AUTO: 0.66 K/UL
MONOCYTES NFR BLD AUTO: 8.7 %
NEUTROPHILS # BLD AUTO: 3.91 K/UL
NEUTROPHILS NFR BLD AUTO: 51.4 %
NONHDLC SERPL-MCNC: 154 MG/DL
PLATELET # BLD AUTO: 247 K/UL
POTASSIUM SERPL-SCNC: 4.2 MMOL/L
PROT SERPL-MCNC: 7.8 G/DL
RBC # BLD: 5.12 M/UL
RBC # FLD: 13.3 %
SODIUM SERPL-SCNC: 138 MMOL/L
TRIGL SERPL-MCNC: 227 MG/DL
TSH SERPL-ACNC: 3.2 UIU/ML
WBC # FLD AUTO: 7.6 K/UL

## 2022-08-12 ENCOUNTER — APPOINTMENT (OUTPATIENT)
Dept: NEUROLOGY | Facility: CLINIC | Age: 27
End: 2022-08-12

## 2022-11-03 ENCOUNTER — APPOINTMENT (OUTPATIENT)
Dept: INTERNAL MEDICINE | Facility: CLINIC | Age: 27
End: 2022-11-03

## 2022-11-03 VITALS
SYSTOLIC BLOOD PRESSURE: 136 MMHG | RESPIRATION RATE: 16 BRPM | HEIGHT: 68 IN | BODY MASS INDEX: 25.16 KG/M2 | TEMPERATURE: 98 F | WEIGHT: 166 LBS | OXYGEN SATURATION: 99 % | DIASTOLIC BLOOD PRESSURE: 80 MMHG | HEART RATE: 90 BPM

## 2022-11-03 DIAGNOSIS — Z78.9 OTHER SPECIFIED HEALTH STATUS: ICD-10-CM

## 2022-11-03 DIAGNOSIS — F10.10 ALCOHOL ABUSE, UNCOMPLICATED: ICD-10-CM

## 2022-11-03 PROCEDURE — 99212 OFFICE O/P EST SF 10 MIN: CPT

## 2022-11-14 NOTE — PHYSICAL EXAM
[No Acute Distress] : no acute distress [Normal Voice/Communication] : normal voice/communication [No Edema] : there was no peripheral edema [Normal] : soft, non-tender, non-distended, no masses palpated, no HSM and normal bowel sounds

## 2022-11-14 NOTE — HISTORY OF PRESENT ILLNESS
[FreeTextEntry1] : Pt here to see if can get ADHD meds. No new issues but saw psychologist who diagnosed him.

## 2022-11-14 NOTE — REVIEW OF SYSTEMS
[Anxiety] : anxiety [Fever] : no fever [Vision Problems] : no vision problems [Nasal Discharge] : no nasal discharge [Shortness Of Breath] : no shortness of breath [Wheezing] : no wheezing [Cough] : no cough [Abdominal Pain] : no abdominal pain [Nausea] : no nausea [Vomiting] : no vomiting [Heartburn] : no heartburn [Dysuria] : no dysuria [Hematuria] : no hematuria [Suicidal] : not suicidal [Insomnia] : no insomnia [Depression] : no depression [de-identified] : Says anxieties not affecting this much as the attention problems.

## 2022-11-14 NOTE — HEALTH RISK ASSESSMENT
[0] : 2) Feeling down, depressed, or hopeless: Not at all (0) [PHQ-2 Negative - No further assessment needed] : PHQ-2 Negative - No further assessment needed [[id number]] : 0

## 2022-12-23 ENCOUNTER — APPOINTMENT (OUTPATIENT)
Dept: NEUROLOGY | Facility: CLINIC | Age: 27
End: 2022-12-23

## 2022-12-23 VITALS
BODY MASS INDEX: 25.76 KG/M2 | OXYGEN SATURATION: 97 % | SYSTOLIC BLOOD PRESSURE: 155 MMHG | HEART RATE: 92 BPM | WEIGHT: 170 LBS | DIASTOLIC BLOOD PRESSURE: 93 MMHG | HEIGHT: 68 IN

## 2022-12-23 PROCEDURE — 99215 OFFICE O/P EST HI 40 MIN: CPT

## 2022-12-23 RX ORDER — COLD-HOT PACK
125 MCG EACH MISCELLANEOUS DAILY
Qty: 90 | Refills: 3 | Status: DISCONTINUED | COMMUNITY
Start: 2022-05-27 | End: 2022-12-23

## 2022-12-23 RX ORDER — COLD-HOT PACK
125 MCG EACH MISCELLANEOUS DAILY
Qty: 1 | Refills: 0 | Status: DISCONTINUED | COMMUNITY
Start: 2021-03-16 | End: 2022-12-23

## 2022-12-23 NOTE — PHYSICAL EXAM
[FreeTextEntry1] : \par \par Mental Status: Knows the month, off on the day by 1, knows the year. Knows the day of the week. Able to tell me name of hospital. Able to calculate number of quarters in $2.55. Difficulty spelling "world" backwards. Difficulty with serial sevens. Speech slightly pressured. Distractible. \par STM 3/3 immediate, 3/3 at three minutes \par CN: VFF, blink to confrontation \par III, IV, VI, PERRL, EOMI \par V sensation normal to light touch, pinprick\par VII normal squint vs resistance, normal smile, face symmetric \par VIII: normal hearing\par IX, X normal gag, symmetric palate, uvula raises midline \par XI normal shrug versus resistance and lateralization of head versus resistance \par XII tongue symmetric, normal strength, no tremor or fasciculation \par Motor: full strength in upper and lower extremities \par Sensory: normal to LT\par Reflexes \par Brachioradialis, biceps, triceps, patella and ankles 1+  \par Coordination: no dysmetria on FNF\par Gait : normal balance and gait, no ataxic movements\par

## 2022-12-23 NOTE — DATA REVIEWED
[de-identified] : 10/11/2022: Neuropsychological Testing: Nancy Stephenson, PhD - diagnosis: ADHD/inattentive type. Also has indication of \par a reading disorder.  [de-identified] : N

## 2022-12-23 NOTE — HISTORY OF PRESENT ILLNESS
[FreeTextEntry1] : Last seen in clinic on 2022. Still with significant difficulties with attention. Gets side-tracked easily. 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 Sometimes brother gets frustrated with him. He is very easily distracted. He has had this problem lifelong. \par \par He had to repeat . Was going for associates degree in Communications but had to postpone as mother had recurrence of breast cancer and they were concerned about COVID exposure for him from school. \par \par He reports that both his brother has ADHD? - not treated and sister as well (she is breast-feeding so had to stop stimulant). He lives with his sister. Smokes marijuana every few months and drinks alcohol rarely. \par \par Sometimes reports transient vertigo, lasting about 5 seconds or so when getting off escalators for example. \par \par He is on Wellbutrin 150 mg daily. He thinks it helps with anxiety and irritability. He stopped it suddenly once and felt very off - so feels it helps him. \par \par Current Medications\par Wellbutrin 150 daily \par \par Social History\par Works full time and drives \par College education\par Has an associates degree \par Associates Degree - want to study communications\par live with sister \par \par Social History\par Smoke marijuana once every 3-6 months \par Drinks every 2 months , on a Friday , doesn't binge drink \par ____________________\par Santiago is a 26-year-old right-handed man with a history of anxiety, vertigo, numerous head injuries presenting for evaluation of difficulties with concentration and memory. This is a chronic problem and began around .  \par He states that in middle school a locker slammed into his head and once he was hit in the head with a baseball. He had no loss of consciousness. [place] in  [place] age 16 he fell through a ceiling. He hit his head but did not lose consciousness. He had numerous cuts/abrasions but did not break/fracture anything. \par \par With regard to memory, he states he can’t recall what he did two days ago. He will often walk somewhere but doesn’t remember what he was doing/ where he was going. He lacks focus and has problems with attention too. Denies depression although there is a chart noted from Dr. Polk which indicates that depression and anxiety improved with paxil.  He is no longer taking it. \par \par He reports history of headaches. Headaches can be left sided or right sided. These are sporadic now. \par No COVID infection No family history of Alzheimer’s. \par \par Past Medical History: \par Anxiety\par Heart murmur\par Vertigo \par Medications:\par None \par Allergies: \par None \par \par Social History\par Lives in [place]\par Drives \par Associates Degree took a semester off. \par Drinks alcohol monthly, no binge drinking\par Smokes marijuana once every three months or so \par Held back in school, , had difficulty tying his shoe, fine motor deficits? \par \par Birth History: \par Born term: had pneumonia, was an emergency , few days in the NICU, not intubated. Breech birth.  no history of meningitis or encephalitis. No history of seizures. \par \par Family History: \par Mother : breast cancer \par Father:  stroke, nicotine dependence \par \par Surgeries: none \par \par  \par

## 2022-12-23 NOTE — DISCUSSION/SUMMARY
[FreeTextEntry1] : Santiago is a pleasant 27-year-old man with a history of anxiety/depression, head injury who initially presented with memory and attention difficulties. Takes bupropion  mg. Previously on paxil but did not tolerate it. \par \par Had recent neuropsychological testing 10/2022 (Dr. Stephenson)  - diagnosis consistent with ADHD (inattentive type). Will refer to Dr. Samuel for consideration of stimulant. Deficits are interfering with his functioning. He hopes to return to college and get a degree in communications. Had slightly high blood pressure today in clinic - should be monitored. \par \par

## 2022-12-23 NOTE — ASSESSMENT
[FreeTextEntry1] : Please follow-up with Dr. Samuel for ADHD -next available - consider trial of stimulant

## 2023-02-13 ENCOUNTER — APPOINTMENT (OUTPATIENT)
Dept: NEUROLOGY | Facility: CLINIC | Age: 28
End: 2023-02-13
Payer: COMMERCIAL

## 2023-02-13 VITALS
HEIGHT: 68 IN | HEART RATE: 86 BPM | DIASTOLIC BLOOD PRESSURE: 93 MMHG | BODY MASS INDEX: 25.01 KG/M2 | WEIGHT: 165 LBS | SYSTOLIC BLOOD PRESSURE: 135 MMHG

## 2023-02-13 PROCEDURE — 99215 OFFICE O/P EST HI 40 MIN: CPT

## 2023-02-15 NOTE — CONSULT LETTER
[Dear  ___] : Dear  [unfilled], [Courtesy Letter:] : I had the pleasure of seeing your patient, [unfilled], in my office today. [Please see my note below.] : Please see my note below. [FreeTextEntry3] : Sincerely,\par \par Luh Samuel M.D.\par

## 2023-02-15 NOTE — ASSESSMENT
[FreeTextEntry1] : He is willing to try Vyvanse. \par Side effects discussed: \par appetite suppression, increased heart rate, blood pressure, increased sweating. can worsen agitation\par \par We may need to increase or change Wellbutrin to another SSRI to help with his attention.\par \par Discussed risk of low seizure threshold- he has no history of seizures. He does not drink regularly and rarely uses marijuana.

## 2023-02-15 NOTE — HISTORY OF PRESENT ILLNESS
[FreeTextEntry1] : This is a 28 y/o man who is being seen at Dr. Mathis's request.  Patient has been diagnosed with inattentive ADD. \par \par Was actually dx during elementary school. Medication was suggested but his mother didn't want medication for him.\par \par He recalls himself being disruptive and very hyper.\par \par He continues to have issues with concentration and attention. Task shifting can be challenging. He has difficulty initiating and completing tasks. He cannot remain focused on a task. Organization can be challenging. He has difficulty following through and completing tasks. Time management is a challenge.\par He can be inattentive.\par Has mood instability. Continues to be "fidgety" and "can't stay still". 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 He ran out and noted that he was very irritable.\par \par He is interested in medication to help with his ADD as he feels that his ability to be successful is now hampered.\par \par \par 2022\par Last seen in clinic on 2022. Still with significant difficulties with attention. Gets side-tracked easily. 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 Sometimes brother gets frustrated with him. He is very easily distracted. He has had this problem lifelong. \par \par He had to repeat . Was going for associates degree in Communications but had to postpone as mother had recurrence of breast cancer and they were concerned about COVID exposure for him from school. \par \par He reports that both his brother has ADHD? - not treated and sister as well (she is breast-feeding so had to stop stimulant). He lives with his sister. Smokes marijuana every few months and drinks alcohol rarely. \par \par Sometimes reports transient vertigo, lasting about 5 seconds or so when getting off escalators for example. \par \par He is on Wellbutrin 150 mg daily. He thinks it helps with anxiety and irritability. He stopped it suddenly once and felt very off - so feels it helps him. \par \par Current Medications\par Wellbutrin 150 daily \par \par Social History\par Works full time and drives \par College education\par Has an associates degree \par Associates Degree - want to study communications\par live with sister \par \par Social History\par Smoke marijuana once every 3-6 months \par Drinks every 2 months , on a Friday , doesn't binge drink \par ____________________\par Santiago is a 26-year-old right-handed man with a history of anxiety, vertigo, numerous head injuries presenting for evaluation of difficulties with concentration and memory. This is a chronic problem and began around .  \par He states that in middle school a locker slammed into his head and once he was hit in the head with a baseball. He had no loss of consciousness. [place] overseas in  at age 16 he fell through a ceiling. He hit his head but did not lose consciousness. He had numerous cuts/abrasions but did not break/fracture anything. \par \par With regard to memory, he states he can’t recall what he did two days ago. He will often walk somewhere but doesn’t remember what he was doing/ where he was going. He lacks focus and has problems with attention too. Denies depression although there is a chart noted from Dr. Polk which indicates that depression and anxiety improved with paxil.  He is no longer taking it. \par \par He reports history of headaches. Headaches can be left sided or right sided. These are sporadic now. \par No COVID infection No family history of Alzheimer’s. \par \par Past Medical History: \par Anxiety\par Heart murmur\par Vertigo \par Medications:\par None \par Allergies: \par None \par \par Social History\par Lives in [place]\par Drives \par Associates Degree took a semester off. \par Drinks alcohol monthly, no binge drinking\par Smokes marijuana once every three months or so \par Held back in school, , had difficulty tying his shoe, fine motor deficits? \par \par Birth History: \par Born term: had pneumonia, was an emergency , few days in the NICU, not intubated. Breech birth.  no history of meningitis or encephalitis. No history of seizures. \par \par Family History: \par Mother : breast cancer \par Father:  stroke, nicotine dependence \par \par Surgeries: none \par \par  \par

## 2023-02-15 NOTE — DATA REVIEWED
[de-identified] : 10/11/2022: Neuropsychological Testing: Nancy Stephenson, PhD - diagnosis: ADHD/inattentive type. Also has indication of \par a reading disorder.

## 2023-03-14 ENCOUNTER — APPOINTMENT (OUTPATIENT)
Dept: NEUROLOGY | Facility: CLINIC | Age: 28
End: 2023-03-14

## 2023-04-12 ENCOUNTER — APPOINTMENT (OUTPATIENT)
Dept: NEUROLOGY | Facility: CLINIC | Age: 28
End: 2023-04-12

## 2023-05-10 ENCOUNTER — APPOINTMENT (OUTPATIENT)
Dept: NEUROLOGY | Facility: CLINIC | Age: 28
End: 2023-05-10
Payer: COMMERCIAL

## 2023-05-10 PROCEDURE — 99202 OFFICE O/P NEW SF 15 MIN: CPT | Mod: 95

## 2023-05-10 PROCEDURE — 99213 OFFICE O/P EST LOW 20 MIN: CPT | Mod: 95

## 2023-05-15 NOTE — HISTORY OF PRESENT ILLNESS
[Home] : at home, [unfilled] , at the time of the visit. [Medical Office: ([facility])___] : at the medical office located in  [Verbal consent obtained from patient] : the patient, [unfilled] [FreeTextEntry1] : 5/10/23\par In f/u.\par Noted Vyvanse helped him some \par Does also help his sister\par \par He would like to restart it at higher dose.\par \par no lateralizing deficits. \par \par 23\par This is a 26 y/o man who is being seen at Dr. Mathis's request.  Patient has been diagnosed with inattentive ADD. \par \par Was actually dx during elementary school. Medication was suggested but his mother didn't want medication for him.\par \par He recalls himself being disruptive and very hyper.\par \par He continues to have issues with concentration and attention. Task shifting can be challenging. He has difficulty initiating and completing tasks. He cannot remain focused on a task. Organization can be challenging. He has difficulty following through and completing tasks. Time management is a challenge.\par He can be inattentive.\par Has mood instability. Continues to be "fidgety" and "can't stay still". 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 He ran out and noted that he was very irritable.\par \par He is interested in medication to help with his ADD as he feels that his ability to be successful is now hampered.\par \par \par 2022\par Last seen in clinic on 2022. Still with significant difficulties with attention. Gets side-tracked easily. 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 Sometimes brother gets frustrated with him. He is very easily distracted. He has had this problem lifelong. \par \par He had to repeat . Was going for associates degree in Communications but had to postpone as mother had recurrence of breast cancer and they were concerned about COVID exposure for him from school. \par \par He reports that both his brother has ADHD? - not treated and sister as well (she is breast-feeding so had to stop stimulant). He lives with his sister. Smokes marijuana every few months and drinks alcohol rarely. \par \par Sometimes reports transient vertigo, lasting about 5 seconds or so when getting off escalators for example. \par \par He is on Wellbutrin 150 mg daily. He thinks it helps with anxiety and irritability. He stopped it suddenly once and felt very off - so feels it helps him. \par \par Current Medications\par Wellbutrin 150 daily \par \par Social History\par Works full time and drives \par College education\par Has an associates degree \par Associates Degree - want to study communications\par live with sister \par \par Social History\par Smoke marijuana once every 3-6 months \par Drinks every 2 months , on a Friday , doesn't binge drink \par ____________________\par Santiago is a 26-year-old right-handed man with a history of anxiety, vertigo, numerous head injuries presenting for evaluation of difficulties with concentration and memory. This is a chronic problem and began around .  \par He states that in middle school a locker slammed into his head and once he was hit in the head with a baseball. He had no loss of consciousness. [place] in  at age 16 he fell through a ceiling. He hit his head but did not lose consciousness. He had numerous cuts/abrasions but did not break/fracture anything. \par \par With regard to memory, he states he can’t recall what he did two days ago. He will often walk somewhere but doesn’t remember what he was doing/ where he was going. He lacks focus and has problems with attention too. Denies depression although there is a chart noted from Dr. Polk which indicates that depression and anxiety improved with paxil.  He is no longer taking it. \par \par He reports history of headaches. Headaches can be left sided or right sided. These are sporadic now. \par No COVID infection No family history of Alzheimer’s. \par \par Past Medical History: \par Anxiety\par Heart murmur\par Vertigo \par Medications:\par None \par Allergies: \par None \par \par Social History\par Lives in [place]\par Drives \par Associates Degree took a semester off. \par Drinks alcohol monthly, no binge drinking\par Smokes marijuana once every three months or so \par Held back in school, , had difficulty tying his shoe, fine motor deficits? \par \par Birth History: \par Born term: had pneumonia, was an emergency , few days in the NICU, not intubated. Breech birth.  no history of meningitis or encephalitis. No history of seizures. \par \par Family History: \par Mother : breast cancer \par Father:  stroke, nicotine dependence \par \par Surgeries: none \par \par  \par

## 2023-05-15 NOTE — PHYSICAL EXAM
[FreeTextEntry1] : \par 2/13/23\par Mental Status: Knows the month, off on the day by 1, knows the year. Knows the day of the week. Able to tell me name of hospital. Able to calculate number of quarters in $2.55. Difficulty spelling "world" backwards. Difficulty with serial sevens. Speech slightly pressured. Distractible. \par STM 3/3 immediate, 3/3 at three minutes \par CN: VFF, blink to confrontation \par III, IV, VI, PERRL, EOMI \par V sensation normal to light touch, pinprick\par VII normal squint vs resistance, normal smile, face symmetric \par VIII: normal hearing\par IX, X normal gag, symmetric palate, uvula raises midline \par XI normal shrug versus resistance and lateralization of head versus resistance \par XII tongue symmetric, normal strength, no tremor or fasciculation \par Motor: full strength in upper and lower extremities \par Sensory: normal to LT\par Reflexes \par Brachioradialis, biceps, triceps, patella and ankles 1+  \par Coordination: no dysmetria on FNF\par Gait : normal balance and gait, no ataxic movements\par

## 2023-05-15 NOTE — ASSESSMENT
[FreeTextEntry1] : Increase Vyvanse to 40 mg for one week, then 50 mg the following week\par \par Side effects discussed: \par appetite suppression, increased heart rate, blood pressure, increased sweating. can worsen agitation\par \par We may need to increase or change Wellbutrin to another SSRI to help with his attention.\par \par Discussed risk of low seizure threshold- he has no history of seizures. He does not drink regularly and rarely uses marijuana.\par \par f/u as routine 6-8 weeks\par \par Of note, I spoke to pharmacist as well to make sure patient would get enough medication and the correct of way of dispensing\par Explained to patient in detail.

## 2023-05-15 NOTE — DATA REVIEWED
[de-identified] : 10/11/2022: Neuropsychological Testing: Nancy Stephenson, PhD - diagnosis: ADHD/inattentive type. Also has indication of \par a reading disorder.

## 2023-06-26 ENCOUNTER — APPOINTMENT (OUTPATIENT)
Dept: INTERNAL MEDICINE | Facility: CLINIC | Age: 28
End: 2023-06-26
Payer: COMMERCIAL

## 2023-06-26 VITALS
HEIGHT: 68 IN | RESPIRATION RATE: 16 BRPM | TEMPERATURE: 97.6 F | DIASTOLIC BLOOD PRESSURE: 80 MMHG | BODY MASS INDEX: 25.46 KG/M2 | WEIGHT: 168 LBS | SYSTOLIC BLOOD PRESSURE: 130 MMHG | OXYGEN SATURATION: 99 % | HEART RATE: 76 BPM

## 2023-06-26 DIAGNOSIS — L02.91 CUTANEOUS ABSCESS, UNSPECIFIED: ICD-10-CM

## 2023-06-26 DIAGNOSIS — Z79.899 OTHER LONG TERM (CURRENT) DRUG THERAPY: ICD-10-CM

## 2023-06-26 DIAGNOSIS — Z00.00 ENCOUNTER FOR GENERAL ADULT MEDICAL EXAMINATION W/OUT ABNORMAL FINDINGS: ICD-10-CM

## 2023-06-26 PROCEDURE — 99395 PREV VISIT EST AGE 18-39: CPT | Mod: 25

## 2023-06-26 PROCEDURE — 99214 OFFICE O/P EST MOD 30 MIN: CPT | Mod: 25

## 2023-06-26 PROCEDURE — 36415 COLL VENOUS BLD VENIPUNCTURE: CPT

## 2023-06-26 RX ORDER — LISDEXAMFETAMINE DIMESYLATE 10 MG/1
10 CAPSULE ORAL
Qty: 30 | Refills: 0 | Status: DISCONTINUED | COMMUNITY
Start: 2023-05-10 | End: 2023-06-26

## 2023-06-26 NOTE — PHYSICAL EXAM
[No Acute Distress] : no acute distress [Normal Voice/Communication] : normal voice/communication [Normal Sclera/Conjunctiva] : normal sclera/conjunctiva [Normal] : soft, non-tender, non-distended, no masses palpated, no HSM and normal bowel sounds [de-identified] : Right buttock superolateral area of firm, nontender dusky erythematous 2 x 4 cm area.  Central scab and no tenderness or fluctuance.

## 2023-06-26 NOTE — HISTORY OF PRESENT ILLNESS
[Other: _____] : [unfilled] [FreeTextEntry1] : Pt is here for annual PE and to go over chronic medical conditions.\par Patient has his anxiety symptoms are well controlled with Wellbutrin.  He sees a psychiatrist for the ADHD treatment but he was told to have his primary to his Wellbutrin refills.\par Patient has noticed episodes of getting a boil on his right buttock area every 3 to 4 months.  The last one he had recently broke on its own and that he saw some pus and blood.  He went to an urgent center who told him to soak himself in Epsom salt.  Now the boil area feels well.

## 2023-06-26 NOTE — REVIEW OF SYSTEMS
[Fever] : no fever [Chest Pain] : no chest pain [Shortness Of Breath] : no shortness of breath [Cough] : no cough [Abdominal Pain] : no abdominal pain [Nausea] : no nausea [Diarrhea] : no diarrhea [Vomiting] : no vomiting

## 2023-06-27 ENCOUNTER — TRANSCRIPTION ENCOUNTER (OUTPATIENT)
Age: 28
End: 2023-06-27

## 2023-06-27 LAB
ALBUMIN SERPL ELPH-MCNC: 4.6 G/DL
ALP BLD-CCNC: 97 U/L
ALT SERPL-CCNC: 29 U/L
ANION GAP SERPL CALC-SCNC: 15 MMOL/L
AST SERPL-CCNC: 22 U/L
BILIRUB SERPL-MCNC: 0.8 MG/DL
BUN SERPL-MCNC: 14 MG/DL
CALCIUM SERPL-MCNC: 9.4 MG/DL
CHLORIDE SERPL-SCNC: 102 MMOL/L
CHOLEST SERPL-MCNC: 177 MG/DL
CO2 SERPL-SCNC: 26 MMOL/L
CREAT SERPL-MCNC: 0.95 MG/DL
CREAT SPEC-SCNC: 330 MG/DL
EGFR: 113 ML/MIN/1.73M2
ESTIMATED AVERAGE GLUCOSE: 123 MG/DL
GLUCOSE SERPL-MCNC: 90 MG/DL
HBA1C MFR BLD HPLC: 5.9 %
HDLC SERPL-MCNC: 33 MG/DL
LDLC SERPL CALC-MCNC: 112 MG/DL
MICROALBUMIN 24H UR DL<=1MG/L-MCNC: 3.5 MG/DL
MICROALBUMIN/CREAT 24H UR-RTO: 10 MG/G
NONHDLC SERPL-MCNC: 145 MG/DL
POTASSIUM SERPL-SCNC: 4.1 MMOL/L
PROT SERPL-MCNC: 7.7 G/DL
SODIUM SERPL-SCNC: 142 MMOL/L
TRIGL SERPL-MCNC: 163 MG/DL

## 2023-07-11 ENCOUNTER — APPOINTMENT (OUTPATIENT)
Dept: NEUROLOGY | Facility: CLINIC | Age: 28
End: 2023-07-11
Payer: COMMERCIAL

## 2023-07-11 VITALS
DIASTOLIC BLOOD PRESSURE: 99 MMHG | SYSTOLIC BLOOD PRESSURE: 164 MMHG | OXYGEN SATURATION: 97 % | BODY MASS INDEX: 25.46 KG/M2 | HEART RATE: 97 BPM | HEIGHT: 68 IN | WEIGHT: 168 LBS

## 2023-07-11 PROCEDURE — 99213 OFFICE O/P EST LOW 20 MIN: CPT

## 2023-07-19 NOTE — ASSESSMENT
[FreeTextEntry1] : update in one week\par if persistently high bp, speak to dr. becerril\par \par fmh update in one week \par IF bp normal, begin methylphenidate \par \par 3 m f/u\par \par Discussed risk of low seizure threshold- he has no history of seizures. He does not drink regularly and rarely uses marijuana.\par

## 2023-07-19 NOTE — DATA REVIEWED
[de-identified] : 10/11/2022: Neuropsychological Testing: Nancy Stephenson, PhD - diagnosis: ADHD/inattentive type. Also has indication of \par a reading disorder.

## 2023-07-19 NOTE — HISTORY OF PRESENT ILLNESS
[Home] : at home, [unfilled] , at the time of the visit. [Medical Office: ([facility])___] : at the medical office located in  [Verbal consent obtained from patient] : the patient, [unfilled] [FreeTextEntry1] : Santiago was here in f/u\par Noted that Vyvanse was not very helpful \par \par Taking Wellbutrin \par tolerating it \par \par 5/10/23\par In f/u.\par Noted Vyvanse helped him some \par Does also help his sister\par \par He would like to restart it at higher dose.\par \par no lateralizing deficits. \par \par 23\par This is a 26 y/o man who is being seen at Dr. Mathis's request.  Patient has been diagnosed with inattentive ADD. \par \par Was actually dx during elementary school. Medication was suggested but his mother didn't want medication for him.\par \par He recalls himself being disruptive and very hyper.\par \par He continues to have issues with concentration and attention. Task shifting can be challenging. He has difficulty initiating and completing tasks. He cannot remain focused on a task. Organization can be challenging. He has difficulty following through and completing tasks. Time management is a challenge.\par He can be inattentive.\par Has mood instability. Continues to be "fidgety" and "can't stay still". 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 He ran out and noted that he was very irritable.\par \par He is interested in medication to help with his ADD as he feels that his ability to be successful is now hampered.\par \par \par 2022\par Last seen in clinic on 2022. Still with significant difficulties with attention. Gets side-tracked easily. 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 Sometimes brother gets frustrated with him. He is very easily distracted. He has had this problem lifelong. \par \par He had to repeat . Was going for associates degree in Communications but had to postpone as mother had recurrence of breast cancer and they were concerned about COVID exposure for him from school. \par \par He reports that both his brother has ADHD? - not treated and sister as well (she is breast-feeding so had to stop stimulant). He lives with his sister. Smokes marijuana every few months and drinks alcohol rarely. \par \par Sometimes reports transient vertigo, lasting about 5 seconds or so when getting off escalators for example. \par \par He is on Wellbutrin 150 mg daily. He thinks it helps with anxiety and irritability. He stopped it suddenly once and felt very off - so feels it helps him. \par \par Current Medications\par Wellbutrin 150 daily \par \par Social History\par Works full time and drives \par College education\par Has an associates degree \par Associates Degree - want to study communications\par live with sister \par \par Social History\par Smoke marijuana once every 3-6 months \par Drinks every 2 months , on a Friday , doesn't binge drink \par ____________________\par Santiago is a 26-year-old right-handed man with a history of anxiety, vertigo, numerous head injuries presenting for evaluation of difficulties with concentration and memory. This is a chronic problem and began around .  \par He states that in middle school a locker slammed into his head and once he was hit in the head with a baseball. He had no loss of consciousness. [place] overseas in  at age 16 he fell through a ceiling. He hit his head but did not lose consciousness. He had numerous cuts/abrasions but did not break/fracture anything. \par \par With regard to memory, he states he can’t recall what he did two days ago. He will often walk somewhere but doesn’t remember what he was doing/ where he was going. He lacks focus and has problems with attention too. Denies depression although there is a chart noted from Dr. Polk which indicates that depression and anxiety improved with paxil.  He is no longer taking it. \par \par He reports history of headaches. Headaches can be left sided or right sided. These are sporadic now. \par No COVID infection No family history of Alzheimer’s. \par \par Past Medical History: \par Anxiety\par Heart murmur\par Vertigo \par Medications:\par None \par Allergies: \par None \par \par Social History\par Lives in [place]\par Drives \par Associates Degree took a semester off. \par Drinks alcohol monthly, no binge drinking\par Smokes marijuana once every three months or so \par Held back in school, , had difficulty tying his shoe, fine motor deficits? \par \par Birth History: \par Born term: had pneumonia, was an emergency , few days in the NICU, not intubated. Breech birth.  no history of meningitis or encephalitis. No history of seizures. \par \par Family History: \par Mother : breast cancer \par Father:  stroke, nicotine dependence \par \par Surgeries: none \par \par  \par

## 2023-07-24 ENCOUNTER — APPOINTMENT (OUTPATIENT)
Dept: SURGERY | Facility: CLINIC | Age: 28
End: 2023-07-24
Payer: COMMERCIAL

## 2023-07-24 ENCOUNTER — NON-APPOINTMENT (OUTPATIENT)
Age: 28
End: 2023-07-24

## 2023-07-24 VITALS
WEIGHT: 165 LBS | DIASTOLIC BLOOD PRESSURE: 87 MMHG | SYSTOLIC BLOOD PRESSURE: 145 MMHG | RESPIRATION RATE: 18 BRPM | HEIGHT: 68 IN | BODY MASS INDEX: 25.01 KG/M2 | HEART RATE: 94 BPM | OXYGEN SATURATION: 95 %

## 2023-07-24 DIAGNOSIS — L98.8 OTHER SPECIFIED DISORDERS OF THE SKIN AND SUBCUTANEOUS TISSUE: ICD-10-CM

## 2023-07-24 PROCEDURE — 99203 OFFICE O/P NEW LOW 30 MIN: CPT

## 2023-07-24 NOTE — HISTORY OF PRESENT ILLNESS
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  Patient notes he has had persistent symptoms for a few years now.  Recently the area seems to drain every month or so.  Has not required incision and drainage by a physician yet.  No prior procedures to the area.  Patient has hair in the  cleft but is unsure if anyone has told him he has a pilonidal cyst.  Medical history only significant for heart murmur which has been stable.  Area recently drained about a week or so ago which is relieve some of the symptoms.

## 2023-07-24 NOTE — ASSESSMENT
[FreeTextEntry1] : Mr. LANE is a 28 year male who presents for pilonidal cyst\par \par Discussed management options for pilonidal including medical management with pit picking and shaving, minimally invasive procedures with skin punch biopsy of sinus and curettage of underlying tissue, wide local excision, or wide local excision with rotational flap.\par \par Given the findings today on exam recommend surgical management with skin punch biopsy and curettage.\par \par Discussed details of surgery including post-operative care and recovery. Should expect 7-10 days of discomfort that will improve each day.  Should expect small amount of drainage from the wounds that will slow as the underlying cavity heals.\par \par Risks and benefits discussed including post-operative pain, delayed wound healing, infection. Risk of recurrent pilonidal disc disease discussed.  All other surgical options remain available if recurrence happens.\par \par Will schedule patient for simple excision of pilonidal cyst with skin punch biopsy and curettage.\par \par  Post-operative recovery and care instructions provided.\par

## 2023-07-24 NOTE — PHYSICAL EXAM
[Abdomen Masses] : No abdominal masses [Abdomen Tenderness] : ~T No ~M abdominal tenderness [Pilonidal Cyst] : a pilonidal cyst [Pilonidal Sinus Draining] : pilonidal sinus drainage [JVD] : no jugular venous distention  [Respiratory Effort] : normal respiratory effort [No Rash or Lesion] : No rash or lesion [Alert] : alert [Calm] : calm [de-identified] : 2 sinus tracts noted in the midline with a open draining tract right of midline [de-identified] : No acute distress

## 2023-07-26 ENCOUNTER — APPOINTMENT (OUTPATIENT)
Dept: INTERNAL MEDICINE | Facility: CLINIC | Age: 28
End: 2023-07-26
Payer: COMMERCIAL

## 2023-07-26 PROCEDURE — 36415 COLL VENOUS BLD VENIPUNCTURE: CPT

## 2023-07-27 ENCOUNTER — TRANSCRIPTION ENCOUNTER (OUTPATIENT)
Age: 28
End: 2023-07-27

## 2023-07-27 LAB
CHOLEST SERPL-MCNC: 143 MG/DL
ESTIMATED AVERAGE GLUCOSE: 114 MG/DL
HBA1C MFR BLD HPLC: 5.6 %
HDLC SERPL-MCNC: 28 MG/DL
LDLC SERPL CALC-MCNC: 89 MG/DL
NONHDLC SERPL-MCNC: 115 MG/DL
TRIGL SERPL-MCNC: 143 MG/DL

## 2023-09-18 ENCOUNTER — APPOINTMENT (OUTPATIENT)
Dept: NEUROLOGY | Facility: CLINIC | Age: 28
End: 2023-09-18

## 2023-10-23 NOTE — HEALTH RISK ASSESSMENT
Vaccine Information Statement(s) or the Emergency Use Authorization was given today. This has been reviewed, questions answered, and verbal consent given by Parent for injection(s) and administration of COVID-19 Immunization Moderna COVID-19, Diphtheria/Tetanus/Pertussis (Dtap), Haemophilus Influenza type b (Hib) and Pneumococcal Conjugate (PCV20).      Patient tolerated without incident. See immunization grid for documentation.     [0] : 1) Little interest or pleasure doing things: Not at all (0) [1] : 2) Feeling down, depressed, or hopeless for several days (1) [[id number]] : 1 [[id number]] : 1

## 2023-11-05 ENCOUNTER — NON-APPOINTMENT (OUTPATIENT)
Age: 28
End: 2023-11-05

## 2023-12-22 ENCOUNTER — APPOINTMENT (OUTPATIENT)
Dept: INTERNAL MEDICINE | Facility: CLINIC | Age: 28
End: 2023-12-22

## 2024-03-06 ENCOUNTER — NON-APPOINTMENT (OUTPATIENT)
Age: 29
End: 2024-03-06

## 2024-03-12 ENCOUNTER — APPOINTMENT (OUTPATIENT)
Dept: INTERNAL MEDICINE | Facility: CLINIC | Age: 29
End: 2024-03-12

## 2024-03-13 ENCOUNTER — APPOINTMENT (OUTPATIENT)
Dept: INTERNAL MEDICINE | Facility: CLINIC | Age: 29
End: 2024-03-13
Payer: COMMERCIAL

## 2024-03-13 VITALS
TEMPERATURE: 98 F | SYSTOLIC BLOOD PRESSURE: 138 MMHG | OXYGEN SATURATION: 97 % | HEIGHT: 68 IN | WEIGHT: 165 LBS | BODY MASS INDEX: 25.01 KG/M2 | RESPIRATION RATE: 18 BRPM | DIASTOLIC BLOOD PRESSURE: 86 MMHG | HEART RATE: 91 BPM

## 2024-03-13 PROCEDURE — 99214 OFFICE O/P EST MOD 30 MIN: CPT

## 2024-03-13 RX ORDER — BUPROPION HYDROCHLORIDE 150 MG/1
150 TABLET, EXTENDED RELEASE ORAL
Qty: 90 | Refills: 3 | Status: DISCONTINUED | COMMUNITY
Start: 2022-02-09 | End: 2024-03-13

## 2024-03-13 RX ORDER — LISDEXAMFETAMINE DIMESYLATE 30 MG/1
30 CAPSULE ORAL
Qty: 30 | Refills: 0 | Status: DISCONTINUED | COMMUNITY
Start: 2023-02-13 | End: 2024-03-13

## 2024-03-13 RX ORDER — METHYLPHENIDATE HYDROCHLORIDE 10 MG/1
10 TABLET ORAL DAILY
Qty: 30 | Refills: 0 | Status: DISCONTINUED | COMMUNITY
Start: 2023-07-26 | End: 2024-03-13

## 2024-03-13 NOTE — HISTORY OF PRESENT ILLNESS
[FreeTextEntry1] : Patient states he was given Wellbutrin for anxiety but developed more fatigue on it.  This is the only medication he was on.  He states things anxiety and depression was decreasing but did not tolerate the side effects.  He is going to see psychiatrist about his ADD treatment in the summer.

## 2024-03-13 NOTE — PHYSICAL EXAM
[Well-Appearing] : well-appearing [No Acute Distress] : no acute distress [Normal Voice/Communication] : normal voice/communication [Speech Grossly Normal] : speech grossly normal [Normal Mood] : the mood was normal

## 2024-03-13 NOTE — HEALTH RISK ASSESSMENT
[0] : 1) Little interest or pleasure doing things: Not at all (0) [I have developed a follow-up plan documented below in the note.] : I have developed a follow-up plan documented below in the note. [PHQ-2 Negative - No further assessment needed] : PHQ-2 Negative - No further assessment needed [[id number]] : 0

## 2024-04-25 ENCOUNTER — APPOINTMENT (OUTPATIENT)
Dept: NEUROLOGY | Facility: CLINIC | Age: 29
End: 2024-04-25
Payer: COMMERCIAL

## 2024-04-25 VITALS
SYSTOLIC BLOOD PRESSURE: 157 MMHG | HEIGHT: 68 IN | BODY MASS INDEX: 25.01 KG/M2 | WEIGHT: 165 LBS | OXYGEN SATURATION: 98 % | HEART RATE: 98 BPM | DIASTOLIC BLOOD PRESSURE: 93 MMHG

## 2024-04-25 PROCEDURE — 99214 OFFICE O/P EST MOD 30 MIN: CPT

## 2024-04-29 NOTE — ASSESSMENT
[FreeTextEntry1] : Begin Vyvanse 30 mg with plans to increase every month  Continue to monitor bp at home.   3 m f/u  Discussed risk of low seizure threshold- he has no history of seizures. He does not drink regularly and rarely uses marijuana.

## 2024-04-29 NOTE — DATA REVIEWED
[de-identified] : 10/11/2022: Neuropsychological Testing: Nancy Stephenson, PhD - diagnosis: ADHD/inattentive type. Also has indication of \par  a reading disorder.

## 2024-04-29 NOTE — PHYSICAL EXAM
[FreeTextEntry1] : Physical examination  General: No acute distress, Awake, Alert.   Mental status  Awake, alert, and oriented to person, time and place, Normal attention span and concentration, Recent and remote memory intact, Language intact, Fund of knowledge intact.  Cranial Nerves  II: VFF  III, IV, VI: PERRL, EOMI.  V: Facial sensation is normal B/L.  VII: Facial strength is normal B/L.  VIII: Gross hearing is intact.  IX, X: Palate is midline and elevates symmetrically.  XI: Trapezius normal strength.  XII: Tongue midline without atrophy or fasciculations.   Motor exam  Muscle tone - no evidence of rigidity or resistance in all 4 extremities.  No atrophy or fasciculations  Muscle Strength: arms and legs, proximal and distal flexors and extensors are normal  No UE drift.  Reflexes  All present, normal, and symmetrical.  Plantars right: mute.  Plantars left: mute.   Coordination  Finger to nose: Normal.  Heel to shin: Normal.   Gait  Normal

## 2024-04-29 NOTE — DISCUSSION/SUMMARY
[FreeTextEntry1] : Santiago is a pleasant 27-year-old man with a history of anxiety/depression, head injury who initially presented with memory and attention difficulties.   Had recent neuropsychological testing 10/2022 (Dr. Stephenson)  - diagnosis consistent with ADHD (inattentive type). Will refer to Dr. Samuel for consideration of stimulant. Deficits are interfering with his functioning. He hopes to return to college and get a degree in communications. Had slightly high blood pressure today in clinic - should be monitored.

## 2024-04-29 NOTE — HISTORY OF PRESENT ILLNESS
[Home] : at home, [unfilled] , at the time of the visit. [Medical Office: ([facility])___] : at the medical office located in  [Verbal consent obtained from patient] : the patient, [unfilled] [FreeTextEntry1] : 25 Here in fu Lost to follow up at last visit felt Vyvanse not helpful but now wants to try it again. His sister has ADD and takes this and found it to be beneficial.    On Sertraline at this time (off Wellbutrin).   State bp at home has been stable - not sure why elevated today  23 Amir was here in f/u Noted that Vyvanse was not very helpful   Taking Wellbutrin  tolerating it   5/10/23 In f/u. Noted Vyvanse helped him some  Does also help his sister  He would like to restart it at higher dose.  no lateralizing deficits.   23 This is a 28 y/o man who is being seen at Dr. Mathis's request.  Patient has been diagnosed with inattentive ADD.   Was actually dx during elementary school. Medication was suggested but his mother didn't want medication for him.  He recalls himself being disruptive and very hyper.  He continues to have issues with concentration and attention. Task shifting can be challenging. He has difficulty initiating and completing tasks. He cannot remain focused on a task. Organization can be challenging. He has difficulty following through and completing tasks. Time management is a challenge. He can be inattentive. Has mood instability. Continues to be "fidgety" and "can't stay still".   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  Was started on on Wellbutrin which has really helped his anxiety and mood. He ran out and noted that he was very irritable.  He is interested in medication to help with his ADD as he feels that his ability to be successful is now hampered.   2022 Last seen in clinic on 2022. Still with significant difficulties with attention. Gets side-tracked easily. 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 Sometimes brother gets frustrated with him. He is very easily distracted. He has had this problem lifelong.   He had to repeat . Was going for associates degree in Communications but had to postpone as mother had recurrence of breast cancer and they were concerned about COVID exposure for him from school.   He reports that both his brother has ADHD? - not treated and sister as well (she is breast-feeding so had to stop stimulant). He lives with his sister. Smokes marijuana every few months and drinks alcohol rarely.   Sometimes reports transient vertigo, lasting about 5 seconds or so when getting off escalators for example.   He is on Wellbutrin 150 mg daily. He thinks it helps with anxiety and irritability. He stopped it suddenly once and felt very off - so feels it helps him.   Current Medications Wellbutrin 150 daily   Social History Works full time and drives  [employer] education Has an associates degree  Associates Degree - want to study communications live with sister   Social History Smoke marijuana once every 3-6 months  Drinks every 2 months , on a Friday , doesn't binge drink  ____________________ Santiago is a 26-year-old right-handed man with a history of anxiety, vertigo, numerous head injuries presenting for evaluation of difficulties with concentration and memory. This is a chronic problem and began around .   He states that in middle school a locker slammed into his head and once he was hit in the head with a baseball. He had no loss of consciousness. While overseas in  at age 16 he fell through a ceiling. He hit his head but did not lose consciousness. He had numerous cuts/abrasions but did not break/fracture anything.   With regard to memory, he states he can't recall what he did two days ago. He will often walk somewhere but doesn't remember what he was doing/ where he was going. He lacks focus and has problems with attention too. Denies depression although there is a chart noted from Dr. Polk which indicates that depression and anxiety improved with paxil.  He is no longer taking it.   He reports history of headaches. Headaches can be left sided or right sided. These are sporadic now.  No COVID infection No family history of Alzheimer's.   Past Medical History:  Anxiety Heart murmur Vertigo  Medications: None  Allergies:  None   Social History Lives in [place] [employer]  Associates Degree took a semester off.  Drinks alcohol monthly, no binge drinking Smokes marijuana once every three months or so  Held back in school, , had difficulty tying his shoe, fine motor deficits?   Birth History:  Born term: had pneumonia, was an emergency , few days in the NICU, not intubated. Breech birth.  no history of meningitis or encephalitis. No history of seizures.   Family History:  Mother : breast cancer  Father:  stroke, nicotine dependence   Surgeries: none

## 2024-05-03 ENCOUNTER — TRANSCRIPTION ENCOUNTER (OUTPATIENT)
Age: 29
End: 2024-05-03

## 2024-05-21 ENCOUNTER — TRANSCRIPTION ENCOUNTER (OUTPATIENT)
Age: 29
End: 2024-05-21

## 2024-05-29 ENCOUNTER — RX RENEWAL (OUTPATIENT)
Age: 29
End: 2024-05-29

## 2024-06-01 ENCOUNTER — APPOINTMENT (OUTPATIENT)
Dept: INTERNAL MEDICINE | Facility: CLINIC | Age: 29
End: 2024-06-01
Payer: COMMERCIAL

## 2024-06-01 VITALS
DIASTOLIC BLOOD PRESSURE: 72 MMHG | OXYGEN SATURATION: 96 % | TEMPERATURE: 97.3 F | HEART RATE: 118 BPM | SYSTOLIC BLOOD PRESSURE: 142 MMHG | BODY MASS INDEX: 26.22 KG/M2 | WEIGHT: 173 LBS | HEIGHT: 68 IN | RESPIRATION RATE: 16 BRPM

## 2024-06-01 VITALS — SYSTOLIC BLOOD PRESSURE: 144 MMHG | DIASTOLIC BLOOD PRESSURE: 92 MMHG

## 2024-06-01 DIAGNOSIS — I42.2 OTHER HYPERTROPHIC CARDIOMYOPATHY: ICD-10-CM

## 2024-06-01 DIAGNOSIS — F90.0 ATTENTION-DEFICIT HYPERACTIVITY DISORDER, PREDOMINANTLY INATTENTIVE TYPE: ICD-10-CM

## 2024-06-01 DIAGNOSIS — R73.09 OTHER ABNORMAL GLUCOSE: ICD-10-CM

## 2024-06-01 DIAGNOSIS — R03.0 ELEVATED BLOOD-PRESSURE READING, W/OUT DIAGNOSIS OF HYPERTENSION: ICD-10-CM

## 2024-06-01 DIAGNOSIS — I10 ESSENTIAL (PRIMARY) HYPERTENSION: ICD-10-CM

## 2024-06-01 DIAGNOSIS — F41.9 ANXIETY DISORDER, UNSPECIFIED: ICD-10-CM

## 2024-06-01 PROCEDURE — 99214 OFFICE O/P EST MOD 30 MIN: CPT

## 2024-06-01 PROCEDURE — G2211 COMPLEX E/M VISIT ADD ON: CPT

## 2024-06-01 PROCEDURE — 36415 COLL VENOUS BLD VENIPUNCTURE: CPT

## 2024-06-01 RX ORDER — SERTRALINE 25 MG/1
25 TABLET, FILM COATED ORAL DAILY
Qty: 90 | Refills: 3 | Status: ACTIVE | COMMUNITY
Start: 2024-03-13 | End: 1900-01-01

## 2024-06-01 RX ORDER — AMLODIPINE BESYLATE 2.5 MG/1
2.5 TABLET ORAL DAILY
Qty: 90 | Refills: 0 | Status: ACTIVE | COMMUNITY
Start: 2024-06-01 | End: 1900-01-01

## 2024-06-01 NOTE — REVIEW OF SYSTEMS
[Chest Pain] : no chest pain [Palpitations] : no palpitations [Lower Ext Edema] : no lower extremity edema [Shortness Of Breath] : no shortness of breath [Cough] : no cough [Abdominal Pain] : no abdominal pain

## 2024-06-01 NOTE — HISTORY OF PRESENT ILLNESS
[FreeTextEntry1] : Patient to follow-up on sertraline prescription and other chronic issues. Patient is going to the psychiatry office at [facility] for ADD treatment.  He says he is doing well on the Vynase that they started.

## 2024-06-04 ENCOUNTER — TRANSCRIPTION ENCOUNTER (OUTPATIENT)
Age: 29
End: 2024-06-04

## 2024-06-06 ENCOUNTER — TRANSCRIPTION ENCOUNTER (OUTPATIENT)
Age: 29
End: 2024-06-06

## 2024-06-12 ENCOUNTER — RESULT REVIEW (OUTPATIENT)
Age: 29
End: 2024-06-12

## 2024-06-12 ENCOUNTER — APPOINTMENT (OUTPATIENT)
Dept: ORTHOPEDIC SURGERY | Facility: CLINIC | Age: 29
End: 2024-06-12
Payer: COMMERCIAL

## 2024-06-12 VITALS — BODY MASS INDEX: 24.25 KG/M2 | WEIGHT: 160 LBS | HEIGHT: 68 IN

## 2024-06-12 DIAGNOSIS — S83.242A OTHER TEAR OF MEDIAL MENISCUS, CURRENT INJURY, LEFT KNEE, INITIAL ENCOUNTER: ICD-10-CM

## 2024-06-12 PROCEDURE — 99203 OFFICE O/P NEW LOW 30 MIN: CPT

## 2024-06-12 NOTE — DISCUSSION/SUMMARY
[de-identified] : left knee possible exacerbation of preexisting meniscus tear will try pt  if not better repeat mri and possible scope

## 2024-06-12 NOTE — PHYSICAL EXAM
[de-identified] : left Knee ranges 0-115 degrees without pain and crepitus stable to varus, valgus, anterior and posterior stress neurovascularly intact distally no pain with hip range of motion negative straight leg raise no effusion, synovitis, or edema or erythema skin intact +medial joint line ttp trace effusion  [de-identified] : well preserved joint spaces both knees standing

## 2024-06-12 NOTE — HISTORY OF PRESENT ILLNESS
[de-identified] : left knee pain for 3 weeks  moving a lot of air conditioners had pain for a while and in 2021 got mri and showed small meniscus tear but got better without surgery been getting a bit better over last few weeks

## 2024-06-26 ENCOUNTER — TRANSCRIPTION ENCOUNTER (OUTPATIENT)
Age: 29
End: 2024-06-26

## 2024-06-26 ENCOUNTER — APPOINTMENT (OUTPATIENT)
Dept: OPHTHALMOLOGY | Facility: CLINIC | Age: 29
End: 2024-06-26

## 2024-06-26 RX ORDER — LISDEXAMFETAMINE 40 MG/1
40 CAPSULE ORAL
Qty: 30 | Refills: 0 | Status: ACTIVE | COMMUNITY
Start: 2024-04-25 | End: 1900-01-01

## 2024-06-28 ENCOUNTER — TRANSCRIPTION ENCOUNTER (OUTPATIENT)
Age: 29
End: 2024-06-28

## 2024-07-15 ENCOUNTER — APPOINTMENT (OUTPATIENT)
Dept: NEUROLOGY | Facility: CLINIC | Age: 29
End: 2024-07-15
Payer: COMMERCIAL

## 2024-07-15 VITALS
WEIGHT: 176 LBS | DIASTOLIC BLOOD PRESSURE: 82 MMHG | OXYGEN SATURATION: 95 % | HEIGHT: 68 IN | SYSTOLIC BLOOD PRESSURE: 163 MMHG | BODY MASS INDEX: 26.67 KG/M2 | HEART RATE: 98 BPM

## 2024-07-15 DIAGNOSIS — F90.0 ATTENTION-DEFICIT HYPERACTIVITY DISORDER, PREDOMINANTLY INATTENTIVE TYPE: ICD-10-CM

## 2024-07-15 PROCEDURE — 99214 OFFICE O/P EST MOD 30 MIN: CPT

## 2024-07-19 ENCOUNTER — APPOINTMENT (OUTPATIENT)
Dept: ORTHOPEDIC SURGERY | Facility: CLINIC | Age: 29
End: 2024-07-19

## 2024-09-06 ENCOUNTER — TRANSCRIPTION ENCOUNTER (OUTPATIENT)
Age: 29
End: 2024-09-06

## 2024-09-12 ENCOUNTER — TRANSCRIPTION ENCOUNTER (OUTPATIENT)
Age: 29
End: 2024-09-12

## 2024-11-07 ENCOUNTER — APPOINTMENT (OUTPATIENT)
Dept: NEUROLOGY | Facility: CLINIC | Age: 29
End: 2024-11-07

## 2024-12-25 PROBLEM — F10.90 ALCOHOL USE: Status: ACTIVE | Noted: 2021-07-15

## 2025-01-13 ENCOUNTER — TRANSCRIPTION ENCOUNTER (OUTPATIENT)
Age: 30
End: 2025-01-13

## 2025-01-14 ENCOUNTER — TRANSCRIPTION ENCOUNTER (OUTPATIENT)
Age: 30
End: 2025-01-14

## 2025-02-10 ENCOUNTER — TRANSCRIPTION ENCOUNTER (OUTPATIENT)
Age: 30
End: 2025-02-10

## 2025-08-07 ENCOUNTER — APPOINTMENT (OUTPATIENT)
Dept: FAMILY MEDICINE | Facility: CLINIC | Age: 30
End: 2025-08-07

## 2025-08-25 ENCOUNTER — APPOINTMENT (OUTPATIENT)
Dept: NEUROLOGY | Facility: CLINIC | Age: 30
End: 2025-08-25
Payer: COMMERCIAL

## 2025-08-25 VITALS
HEIGHT: 68 IN | WEIGHT: 176 LBS | SYSTOLIC BLOOD PRESSURE: 146 MMHG | OXYGEN SATURATION: 98 % | BODY MASS INDEX: 26.67 KG/M2 | DIASTOLIC BLOOD PRESSURE: 91 MMHG

## 2025-08-25 DIAGNOSIS — F41.9 ANXIETY DISORDER, UNSPECIFIED: ICD-10-CM

## 2025-08-25 DIAGNOSIS — I10 ESSENTIAL (PRIMARY) HYPERTENSION: ICD-10-CM

## 2025-08-25 DIAGNOSIS — F90.0 ATTENTION-DEFICIT HYPERACTIVITY DISORDER, PREDOMINANTLY INATTENTIVE TYPE: ICD-10-CM

## 2025-08-25 PROCEDURE — 99215 OFFICE O/P EST HI 40 MIN: CPT
